# Patient Record
Sex: FEMALE | Race: WHITE | NOT HISPANIC OR LATINO | Employment: FULL TIME | URBAN - METROPOLITAN AREA
[De-identification: names, ages, dates, MRNs, and addresses within clinical notes are randomized per-mention and may not be internally consistent; named-entity substitution may affect disease eponyms.]

---

## 2023-02-14 PROBLEM — J30.2 SEASONAL ALLERGIES: Status: ACTIVE | Noted: 2023-02-14

## 2023-02-14 PROBLEM — G43.909 MIGRAINE HEADACHE: Status: ACTIVE | Noted: 2023-02-14

## 2023-02-14 PROBLEM — J06.9 URI (UPPER RESPIRATORY INFECTION): Status: ACTIVE | Noted: 2023-02-14

## 2023-02-14 PROBLEM — R82.89 ABNORMAL URINE CYTOLOGY: Status: ACTIVE | Noted: 2023-02-14

## 2023-02-14 PROBLEM — N20.0 KIDNEY STONES: Status: ACTIVE | Noted: 2023-02-14

## 2023-02-14 PROBLEM — R07.89 CHEST WALL PAIN: Status: ACTIVE | Noted: 2023-02-14

## 2023-02-14 PROBLEM — M25.561 CHRONIC PAIN OF RIGHT KNEE: Status: ACTIVE | Noted: 2023-02-14

## 2023-02-14 PROBLEM — I20.1 CORONARY ARTERY SPASM (CMS-HCC): Status: ACTIVE | Noted: 2023-02-14

## 2023-02-14 PROBLEM — R00.2 HEART PALPITATIONS: Status: ACTIVE | Noted: 2023-02-14

## 2023-02-14 PROBLEM — I34.0 MITRAL REGURGITATION: Status: ACTIVE | Noted: 2023-02-14

## 2023-02-14 PROBLEM — R01.1 HEART MURMUR, SYSTOLIC: Status: ACTIVE | Noted: 2023-02-14

## 2023-02-14 PROBLEM — G89.29 CHRONIC PAIN OF RIGHT KNEE: Status: ACTIVE | Noted: 2023-02-14

## 2023-02-14 PROBLEM — L50.9 URTICARIA: Status: ACTIVE | Noted: 2023-02-14

## 2023-02-14 PROBLEM — K57.30 DIVERTICULOSIS OF COLON: Status: ACTIVE | Noted: 2023-02-14

## 2023-02-14 PROBLEM — E66.09 CLASS 2 OBESITY DUE TO EXCESS CALORIES WITHOUT SERIOUS COMORBIDITY WITH BODY MASS INDEX (BMI) OF 36.0 TO 36.9 IN ADULT: Status: ACTIVE | Noted: 2023-02-14

## 2023-02-14 PROBLEM — E66.812 CLASS 2 OBESITY DUE TO EXCESS CALORIES WITHOUT SERIOUS COMORBIDITY WITH BODY MASS INDEX (BMI) OF 36.0 TO 36.9 IN ADULT: Status: ACTIVE | Noted: 2023-02-14

## 2023-02-14 PROBLEM — G43.109 CLASSIC MIGRAINE WITH AURA: Status: ACTIVE | Noted: 2023-02-14

## 2023-02-14 PROBLEM — G47.00 INSOMNIA: Status: ACTIVE | Noted: 2023-02-14

## 2023-02-14 PROBLEM — I49.3 SYMPTOMATIC PVCS: Status: ACTIVE | Noted: 2023-02-14

## 2023-02-14 PROBLEM — J06.9 ACUTE URI: Status: ACTIVE | Noted: 2023-02-14

## 2023-02-14 PROBLEM — J44.9 COPD, MODERATE (MULTI): Status: ACTIVE | Noted: 2023-02-14

## 2023-02-14 PROBLEM — R94.31 ABNORMAL EKG: Status: ACTIVE | Noted: 2023-02-14

## 2023-02-14 PROBLEM — Z86.69 HISTORY OF RECURRENT EAR INFECTION: Status: ACTIVE | Noted: 2023-02-14

## 2023-02-14 PROBLEM — Z72.821 INADEQUATE SLEEP HYGIENE: Status: ACTIVE | Noted: 2023-02-14

## 2023-02-14 RX ORDER — CETIRIZINE HYDROCHLORIDE 10 MG/1
1 TABLET ORAL DAILY
COMMUNITY
Start: 2022-04-14 | End: 2023-03-27 | Stop reason: ALTCHOICE

## 2023-02-14 RX ORDER — MULTIVITAMIN
1 TABLET ORAL DAILY
COMMUNITY
Start: 2022-03-25

## 2023-02-14 RX ORDER — ESTRADIOL 0.5 MG/1
1 TABLET ORAL DAILY
COMMUNITY
Start: 2022-03-25 | End: 2023-03-27 | Stop reason: ALTCHOICE

## 2023-02-14 RX ORDER — ALBUTEROL SULFATE 90 UG/1
2 AEROSOL, METERED RESPIRATORY (INHALATION) EVERY 4 HOURS PRN
COMMUNITY
End: 2023-03-27 | Stop reason: ALTCHOICE

## 2023-02-14 RX ORDER — FLUTICASONE PROPIONATE 50 MCG
SPRAY, SUSPENSION (ML) NASAL
COMMUNITY
End: 2023-09-08 | Stop reason: ALTCHOICE

## 2023-03-26 ASSESSMENT — PROMIS GLOBAL HEALTH SCALE
CARRYOUT_PHYSICAL_ACTIVITIES: COMPLETELY
EMOTIONAL_PROBLEMS: OFTEN
RATE_SOCIAL_SATISFACTION: VERY GOOD
RATE_QUALITY_OF_LIFE: VERY GOOD
RATE_PHYSICAL_HEALTH: FAIR
CARRYOUT_SOCIAL_ACTIVITIES: VERY GOOD
RATE_MENTAL_HEALTH: GOOD
RATE_AVERAGE_PAIN: 4
RATE_GENERAL_HEALTH: GOOD
RATE_AVERAGE_FATIGUE: SEVERE

## 2023-03-27 ENCOUNTER — OFFICE VISIT (OUTPATIENT)
Dept: PRIMARY CARE | Facility: CLINIC | Age: 53
End: 2023-03-27
Payer: COMMERCIAL

## 2023-03-27 VITALS
HEIGHT: 65 IN | DIASTOLIC BLOOD PRESSURE: 77 MMHG | WEIGHT: 221 LBS | RESPIRATION RATE: 14 BRPM | HEART RATE: 76 BPM | TEMPERATURE: 97.7 F | SYSTOLIC BLOOD PRESSURE: 117 MMHG | BODY MASS INDEX: 36.82 KG/M2

## 2023-03-27 DIAGNOSIS — N95.1 PERIMENOPAUSE: Primary | ICD-10-CM

## 2023-03-27 DIAGNOSIS — Z12.31 ENCOUNTER FOR SCREENING MAMMOGRAM FOR MALIGNANT NEOPLASM OF BREAST: ICD-10-CM

## 2023-03-27 PROBLEM — J44.9 COPD, MODERATE (MULTI): Status: RESOLVED | Noted: 2023-02-14 | Resolved: 2023-03-27

## 2023-03-27 PROBLEM — I49.3 SYMPTOMATIC PVCS: Status: RESOLVED | Noted: 2023-02-14 | Resolved: 2023-03-27

## 2023-03-27 PROBLEM — R94.31 ABNORMAL EKG: Status: RESOLVED | Noted: 2023-02-14 | Resolved: 2023-03-27

## 2023-03-27 PROBLEM — J06.9 ACUTE URI: Status: RESOLVED | Noted: 2023-02-14 | Resolved: 2023-03-27

## 2023-03-27 PROBLEM — L50.9 URTICARIA: Status: RESOLVED | Noted: 2023-02-14 | Resolved: 2023-03-27

## 2023-03-27 PROBLEM — Z86.69 HISTORY OF RECURRENT EAR INFECTION: Status: RESOLVED | Noted: 2023-02-14 | Resolved: 2023-03-27

## 2023-03-27 PROBLEM — Z72.821 INADEQUATE SLEEP HYGIENE: Status: RESOLVED | Noted: 2023-02-14 | Resolved: 2023-03-27

## 2023-03-27 PROBLEM — G43.909 MIGRAINE HEADACHE: Status: RESOLVED | Noted: 2023-02-14 | Resolved: 2023-03-27

## 2023-03-27 PROBLEM — R00.2 HEART PALPITATIONS: Status: RESOLVED | Noted: 2023-02-14 | Resolved: 2023-03-27

## 2023-03-27 PROBLEM — J06.9 URI (UPPER RESPIRATORY INFECTION): Status: RESOLVED | Noted: 2023-02-14 | Resolved: 2023-03-27

## 2023-03-27 PROBLEM — R82.89 ABNORMAL URINE CYTOLOGY: Status: RESOLVED | Noted: 2023-02-14 | Resolved: 2023-03-27

## 2023-03-27 PROBLEM — R07.89 CHEST WALL PAIN: Status: RESOLVED | Noted: 2023-02-14 | Resolved: 2023-03-27

## 2023-03-27 PROCEDURE — 1036F TOBACCO NON-USER: CPT | Performed by: FAMILY MEDICINE

## 2023-03-27 PROCEDURE — 99396 PREV VISIT EST AGE 40-64: CPT | Performed by: FAMILY MEDICINE

## 2023-03-27 RX ORDER — FLUOXETINE 10 MG/1
10 CAPSULE ORAL DAILY
Qty: 30 CAPSULE | Refills: 3 | Status: SHIPPED | OUTPATIENT
Start: 2023-03-27 | End: 2023-08-28 | Stop reason: WASHOUT

## 2023-03-27 NOTE — PROGRESS NOTES
Subjective   Nelda Hewitt is a 52 y.o. female who presents for a wellness visit.  HPI    Review of Systems  Visit Vitals  /77   Pulse 76   Temp 36.5 °C (97.7 °F)   Resp 14      Objective   Physical Exam  Constitutional:       Appearance: Normal appearance.   HENT:      Head: Normocephalic.   Eyes:      Conjunctiva/sclera: Conjunctivae normal.   Cardiovascular:      Rate and Rhythm: Normal rate and regular rhythm.   Pulmonary:      Effort: Pulmonary effort is normal.      Breath sounds: Normal breath sounds.   Musculoskeletal:      Cervical back: Neck supple.   Skin:     General: Skin is warm and dry.   Neurological:      Mental Status: She is alert.            Assessment/Plan    Nelda was seen today for annual exam.  Diagnoses and all orders for this visit:  Perimenopause  -     FLUoxetine (PROzac) 10 mg capsule; Take 1 capsule (10 mg) by mouth once daily.  Encounter for screening mammogram for malignant neoplasm of breast  -     CBC; Future  -     Comprehensive Metabolic Panel; Future  -     Lipid Panel; Future  -     Hepatitis C Antibody; Future  -     BI mammo bilateral screening tomosynthesis; Future  -     Follow Up In [facility] - PCP; Future       Perimenopause  Hot flashes and fatigue are the main problem.  Estrogen gave her headaches so we will try a low dose fluoxetine to mitigate symptoms

## 2023-04-01 ENCOUNTER — LAB (OUTPATIENT)
Dept: LAB | Facility: LAB | Age: 53
End: 2023-04-01
Payer: COMMERCIAL

## 2023-04-01 DIAGNOSIS — Z12.31 ENCOUNTER FOR SCREENING MAMMOGRAM FOR MALIGNANT NEOPLASM OF BREAST: ICD-10-CM

## 2023-04-01 LAB
ALANINE AMINOTRANSFERASE (SGPT) (U/L) IN SER/PLAS: 14 U/L (ref 7–45)
ALBUMIN (G/DL) IN SER/PLAS: 4 G/DL (ref 3.4–5)
ALKALINE PHOSPHATASE (U/L) IN SER/PLAS: 75 U/L (ref 33–110)
ANION GAP IN SER/PLAS: 14 MMOL/L (ref 10–20)
ASPARTATE AMINOTRANSFERASE (SGOT) (U/L) IN SER/PLAS: 15 U/L (ref 9–39)
BILIRUBIN TOTAL (MG/DL) IN SER/PLAS: 0.6 MG/DL (ref 0–1.2)
CALCIUM (MG/DL) IN SER/PLAS: 9.3 MG/DL (ref 8.6–10.3)
CARBON DIOXIDE, TOTAL (MMOL/L) IN SER/PLAS: 24 MMOL/L (ref 21–32)
CHLORIDE (MMOL/L) IN SER/PLAS: 108 MMOL/L (ref 98–107)
CHOLESTEROL (MG/DL) IN SER/PLAS: 187 MG/DL (ref 0–199)
CHOLESTEROL IN HDL (MG/DL) IN SER/PLAS: 46.6 MG/DL
CHOLESTEROL/HDL RATIO: 4
CREATININE (MG/DL) IN SER/PLAS: 0.67 MG/DL (ref 0.5–1.05)
ERYTHROCYTE DISTRIBUTION WIDTH (RATIO) BY AUTOMATED COUNT: 14.1 % (ref 11.5–14.5)
ERYTHROCYTE MEAN CORPUSCULAR HEMOGLOBIN CONCENTRATION (G/DL) BY AUTOMATED: 32.1 G/DL (ref 32–36)
ERYTHROCYTE MEAN CORPUSCULAR VOLUME (FL) BY AUTOMATED COUNT: 93 FL (ref 80–100)
ERYTHROCYTES (10*6/UL) IN BLOOD BY AUTOMATED COUNT: 4.44 X10E12/L (ref 4–5.2)
GFR FEMALE: >90 ML/MIN/1.73M2
GLUCOSE (MG/DL) IN SER/PLAS: 111 MG/DL (ref 74–99)
HEMATOCRIT (%) IN BLOOD BY AUTOMATED COUNT: 41.1 % (ref 36–46)
HEMOGLOBIN (G/DL) IN BLOOD: 13.2 G/DL (ref 12–16)
HEPATITIS C VIRUS AB PRESENCE IN SERUM: NONREACTIVE
LDL: 114 MG/DL (ref 0–99)
LEUKOCYTES (10*3/UL) IN BLOOD BY AUTOMATED COUNT: 7.5 X10E9/L (ref 4.4–11.3)
PLATELETS (10*3/UL) IN BLOOD AUTOMATED COUNT: 335 X10E9/L (ref 150–450)
POTASSIUM (MMOL/L) IN SER/PLAS: 4.2 MMOL/L (ref 3.5–5.3)
PROTEIN TOTAL: 6.6 G/DL (ref 6.4–8.2)
SODIUM (MMOL/L) IN SER/PLAS: 142 MMOL/L (ref 136–145)
TRIGLYCERIDE (MG/DL) IN SER/PLAS: 133 MG/DL (ref 0–149)
UREA NITROGEN (MG/DL) IN SER/PLAS: 12 MG/DL (ref 6–23)
VLDL: 27 MG/DL (ref 0–40)

## 2023-04-01 PROCEDURE — 80061 LIPID PANEL: CPT

## 2023-04-01 PROCEDURE — 85027 COMPLETE CBC AUTOMATED: CPT

## 2023-04-01 PROCEDURE — 36415 COLL VENOUS BLD VENIPUNCTURE: CPT

## 2023-04-01 PROCEDURE — 80053 COMPREHEN METABOLIC PANEL: CPT

## 2023-04-01 PROCEDURE — 86803 HEPATITIS C AB TEST: CPT

## 2023-05-03 ENCOUNTER — OFFICE VISIT (OUTPATIENT)
Dept: PRIMARY CARE | Facility: CLINIC | Age: 53
End: 2023-05-03
Payer: COMMERCIAL

## 2023-05-03 VITALS
RESPIRATION RATE: 16 BRPM | HEART RATE: 56 BPM | SYSTOLIC BLOOD PRESSURE: 108 MMHG | BODY MASS INDEX: 36.82 KG/M2 | DIASTOLIC BLOOD PRESSURE: 60 MMHG | TEMPERATURE: 97.7 F | HEIGHT: 65 IN | WEIGHT: 221 LBS

## 2023-05-03 DIAGNOSIS — R73.9 HYPERGLYCEMIA: ICD-10-CM

## 2023-05-03 LAB — POC HEMOGLOBIN A1C: 5.4 % (ref 4.2–6.5)

## 2023-05-03 PROCEDURE — 83036 HEMOGLOBIN GLYCOSYLATED A1C: CPT | Performed by: FAMILY MEDICINE

## 2023-05-03 PROCEDURE — 3008F BODY MASS INDEX DOCD: CPT | Performed by: FAMILY MEDICINE

## 2023-05-03 PROCEDURE — 1036F TOBACCO NON-USER: CPT | Performed by: FAMILY MEDICINE

## 2023-05-03 PROCEDURE — 99212 OFFICE O/P EST SF 10 MIN: CPT | Performed by: FAMILY MEDICINE

## 2023-05-03 NOTE — PROGRESS NOTES
Subjective   Nelda Hewitt is a 52 y.o. female who presents for Hyperglycemia.  Hyperglycemia  This is a new problem. She has tried nothing for the symptoms.     Visit Vitals  /60   Pulse 56   Temp 36.5 °C (97.7 °F)   Resp 16      Objective   Physical Exam  Constitutional:       Appearance: Normal appearance.   HENT:      Head: Normocephalic.   Pulmonary:      Effort: Pulmonary effort is normal.   Musculoskeletal:      Cervical back: Neck supple.      Right lower leg: No edema.      Left lower leg: No edema.   Skin:     General: Skin is warm and dry.   Psychiatric:         Mood and Affect: Mood normal.         Assessment/Plan    Problem List Items Addressed This Visit    None  Visit Diagnoses       Hyperglycemia        Relevant Orders    POCT glycosylated hemoglobin (Hb A1C) manually resulted (Completed)        This 52-year-old female had a mildly elevated glucose at 111 on a screening fasting metabolic panel.  She comes in today without any symptoms of hyper or hypoglycemia A1c that is within the normal range.  Thankfully this does not represent diabetes or any risk for diabetes.  As always I recommend healthy lifestyle and diet.  I recommend she follow-up in 1 year for her routine wellness physical.  We took time today to review all of the labs including a lipid panel, CBC, mammogram, and metabolic panel which are essentially all within normal limits.

## 2023-08-24 ENCOUNTER — OFFICE VISIT (OUTPATIENT)
Dept: PRIMARY CARE | Facility: CLINIC | Age: 53
End: 2023-08-24
Payer: COMMERCIAL

## 2023-08-24 VITALS
TEMPERATURE: 98.7 F | OXYGEN SATURATION: 97 % | SYSTOLIC BLOOD PRESSURE: 124 MMHG | DIASTOLIC BLOOD PRESSURE: 82 MMHG | HEART RATE: 82 BPM | BODY MASS INDEX: 37.99 KG/M2 | RESPIRATION RATE: 17 BRPM | HEIGHT: 65 IN | WEIGHT: 228 LBS

## 2023-08-24 DIAGNOSIS — J02.9 SORE THROAT: Primary | ICD-10-CM

## 2023-08-24 LAB — POC RAPID STREP: NEGATIVE

## 2023-08-24 PROCEDURE — 87880 STREP A ASSAY W/OPTIC: CPT | Performed by: FAMILY MEDICINE

## 2023-08-24 PROCEDURE — 3008F BODY MASS INDEX DOCD: CPT | Performed by: FAMILY MEDICINE

## 2023-08-24 PROCEDURE — 1036F TOBACCO NON-USER: CPT | Performed by: FAMILY MEDICINE

## 2023-08-24 PROCEDURE — 99213 OFFICE O/P EST LOW 20 MIN: CPT | Performed by: FAMILY MEDICINE

## 2023-08-24 PROCEDURE — 87651 STREP A DNA AMP PROBE: CPT

## 2023-08-24 ASSESSMENT — ENCOUNTER SYMPTOMS
DIFFICULTY URINATING: 0
VOMITING: 0
SWOLLEN GLANDS: 1
DIARRHEA: 0
SHORTNESS OF BREATH: 0
HEADACHES: 0
SINUS PAIN: 1
COUGH: 0
SORE THROAT: 1
WHEEZING: 0
FATIGUE: 0
FEVER: 0
MYALGIAS: 0
NAUSEA: 0

## 2023-08-24 NOTE — ASSESSMENT & PLAN NOTE
Advise pt rst negative, will send pcr and notify pt if positive and tx as needed  May use tylenol/ motrin,throat spray ,lozenges , saline gargles,  increase flds, rest  F/up if no improvement

## 2023-08-24 NOTE — PROGRESS NOTES
"TomSubjective   Patient ID: Nelda Hewitt is a 53 y.o. female who presents for URI.    URI   This is a new problem. The current episode started in the past 7 days. The problem has been gradually worsening. Associated symptoms include congestion, ear pain, a plugged ear sensation, sinus pain, a sore throat and swollen glands. Pertinent negatives include no coughing, diarrhea, headaches, nausea, vomiting or wheezing.        Review of Systems   Constitutional:  Negative for fatigue and fever.        3-4 d duration of symptoms. Grand kids have strep. No travel. No other contagious exposures.   HENT:  Positive for congestion, ear pain, sinus pain and sore throat. Negative for ear discharge.         Pt has taken mucinex   Respiratory:  Negative for cough, shortness of breath and wheezing.    Gastrointestinal:  Negative for diarrhea, nausea and vomiting.   Genitourinary:  Negative for difficulty urinating.   Musculoskeletal:  Negative for myalgias.   Neurological:  Negative for headaches.       Objective   /82 (BP Location: Right arm, Patient Position: Sitting, BP Cuff Size: Adult)   Pulse 82   Temp 37.1 °C (98.7 °F)   Resp 17   Ht 1.651 m (5' 5\")   Wt 103 kg (228 lb)   SpO2 97%   BMI 37.94 kg/m²     Physical Exam  Vitals and nursing note reviewed.   Constitutional:       General: She is not in acute distress.     Appearance: Normal appearance.   HENT:      Head: Normocephalic and atraumatic.      Right Ear: Tympanic membrane, ear canal and external ear normal.      Left Ear: Tympanic membrane, ear canal and external ear normal.      Nose: Congestion present.      Mouth/Throat:      Mouth: Mucous membranes are moist.      Pharynx: Posterior oropharyngeal erythema present.   Cardiovascular:      Rate and Rhythm: Normal rate and regular rhythm.      Heart sounds: Normal heart sounds.   Pulmonary:      Effort: Pulmonary effort is normal.      Breath sounds: Normal breath sounds.   Musculoskeletal:      " Cervical back: Neck supple.   Lymphadenopathy:      Cervical: No cervical adenopathy.   Skin:     General: Skin is warm and dry.   Neurological:      Mental Status: She is alert.         Assessment/Plan   Problem List Items Addressed This Visit       Sore throat - Primary     Advise pt rst negative, will send pcr and notify pt if positive and tx as needed  May use tylenol/ motrin,throat spray ,lozenges , saline gargles,  increase flds, rest  F/up if no improvement         Relevant Orders    POCT Rapid Strep A manually resulted (Completed)    Group A Streptococcus, PCR

## 2023-08-25 LAB — GROUP A STREP, PCR: NOT DETECTED

## 2023-08-28 ENCOUNTER — OFFICE VISIT (OUTPATIENT)
Dept: PRIMARY CARE | Facility: CLINIC | Age: 53
End: 2023-08-28
Payer: COMMERCIAL

## 2023-08-28 VITALS
TEMPERATURE: 97.7 F | WEIGHT: 228 LBS | BODY MASS INDEX: 37.94 KG/M2 | OXYGEN SATURATION: 97 % | HEART RATE: 86 BPM | SYSTOLIC BLOOD PRESSURE: 118 MMHG | DIASTOLIC BLOOD PRESSURE: 72 MMHG | RESPIRATION RATE: 18 BRPM

## 2023-08-28 DIAGNOSIS — H66.93 ACUTE BILATERAL OTITIS MEDIA: Primary | ICD-10-CM

## 2023-08-28 PROCEDURE — 1036F TOBACCO NON-USER: CPT | Performed by: FAMILY MEDICINE

## 2023-08-28 PROCEDURE — 99214 OFFICE O/P EST MOD 30 MIN: CPT | Performed by: FAMILY MEDICINE

## 2023-08-28 PROCEDURE — 3008F BODY MASS INDEX DOCD: CPT | Performed by: FAMILY MEDICINE

## 2023-08-28 RX ORDER — AZITHROMYCIN 250 MG/1
TABLET, FILM COATED ORAL
Qty: 6 TABLET | Refills: 0 | Status: SHIPPED | OUTPATIENT
Start: 2023-08-28 | End: 2023-09-02

## 2023-08-28 ASSESSMENT — ENCOUNTER SYMPTOMS
SHORTNESS OF BREATH: 0
MYALGIAS: 1
COUGH: 0
SORE THROAT: 0
DIARRHEA: 0
FATIGUE: 0
NAUSEA: 0
DIFFICULTY URINATING: 0
WHEEZING: 0
FEVER: 0
RHINORRHEA: 0
VOMITING: 0

## 2023-08-28 NOTE — ASSESSMENT & PLAN NOTE
Advise pt to take med as directed  Cont flonase  May take tylenol or motrin as needed  F/up if no improvement

## 2023-08-28 NOTE — PROGRESS NOTES
Subjective   Patient ID: Nelda Hewitt is a 53 y.o. female who presents for Earache.    Earache   There is pain in both ears. This is a new problem. The current episode started in the past 7 days (4 days). The problem occurs constantly. The problem has been unchanged. There has been no fever. Pertinent negatives include no coughing, diarrhea, ear discharge, rhinorrhea, sore throat or vomiting. Treatments tried: Flonase. The treatment provided no relief.        Review of Systems   Constitutional:  Negative for fatigue and fever.   HENT:  Positive for ear pain. Negative for congestion, ear discharge, rhinorrhea and sore throat.         Bilateral ear pain. Pt did home covid test and was neg. Last visit 8/24/23 rst and pcr both negative.   Respiratory:  Negative for cough, shortness of breath and wheezing.    Gastrointestinal:  Negative for diarrhea, nausea and vomiting.   Genitourinary:  Negative for difficulty urinating.   Musculoskeletal:  Positive for myalgias.       Objective   /72   Pulse 86   Temp 36.5 °C (97.7 °F) (Temporal)   Resp 18   Wt 103 kg (228 lb)   SpO2 97%   BMI 37.94 kg/m²     Physical Exam  Vitals and nursing note reviewed.   Constitutional:       General: She is not in acute distress.     Appearance: Normal appearance.   HENT:      Head: Normocephalic and atraumatic.      Right Ear: Ear canal and external ear normal.      Left Ear: Ear canal and external ear normal.      Ears:      Comments: Bilateral TMs are pink red, no effusions,      Nose: Nose normal.      Mouth/Throat:      Mouth: Mucous membranes are moist.      Pharynx: Oropharynx is clear.   Cardiovascular:      Rate and Rhythm: Normal rate and regular rhythm.      Heart sounds: Normal heart sounds.   Pulmonary:      Effort: Pulmonary effort is normal.      Breath sounds: Normal breath sounds.   Musculoskeletal:      Cervical back: Neck supple.   Lymphadenopathy:      Cervical: No cervical adenopathy.   Neurological:       Mental Status: She is alert.         Assessment/Plan   Problem List Items Addressed This Visit       Acute bilateral otitis media - Primary     Advise pt to take med as directed  Cont flonase  May take tylenol or motrin as needed  F/up if no improvement         Relevant Medications    azithromycin (Zithromax) 250 mg tablet

## 2023-09-08 ENCOUNTER — OFFICE VISIT (OUTPATIENT)
Dept: PRIMARY CARE | Facility: CLINIC | Age: 53
End: 2023-09-08
Payer: COMMERCIAL

## 2023-09-08 VITALS
HEART RATE: 96 BPM | TEMPERATURE: 98.3 F | RESPIRATION RATE: 14 BRPM | HEIGHT: 65 IN | BODY MASS INDEX: 34.66 KG/M2 | DIASTOLIC BLOOD PRESSURE: 78 MMHG | SYSTOLIC BLOOD PRESSURE: 113 MMHG | WEIGHT: 208 LBS

## 2023-09-08 DIAGNOSIS — J34.3 HYPERTROPHY OF BOTH INFERIOR NASAL TURBINATES: ICD-10-CM

## 2023-09-08 DIAGNOSIS — H69.93 DYSFUNCTION OF BOTH EUSTACHIAN TUBES: Primary | ICD-10-CM

## 2023-09-08 PROBLEM — G43.909 MIGRAINE HEADACHE: Status: ACTIVE | Noted: 2023-02-14

## 2023-09-08 PROBLEM — J02.9 SORE THROAT: Status: RESOLVED | Noted: 2023-08-24 | Resolved: 2023-09-08

## 2023-09-08 PROBLEM — H66.93 ACUTE BILATERAL OTITIS MEDIA: Status: RESOLVED | Noted: 2023-08-28 | Resolved: 2023-09-08

## 2023-09-08 PROCEDURE — 99213 OFFICE O/P EST LOW 20 MIN: CPT | Performed by: FAMILY MEDICINE

## 2023-09-08 PROCEDURE — 3008F BODY MASS INDEX DOCD: CPT | Performed by: FAMILY MEDICINE

## 2023-09-08 PROCEDURE — 1036F TOBACCO NON-USER: CPT | Performed by: FAMILY MEDICINE

## 2023-09-08 RX ORDER — CETIRIZINE HYDROCHLORIDE 10 MG/1
1 TABLET ORAL DAILY
COMMUNITY
Start: 2022-04-14 | End: 2023-09-08 | Stop reason: ALTCHOICE

## 2023-09-08 RX ORDER — ALBUTEROL SULFATE 90 UG/1
2 AEROSOL, METERED RESPIRATORY (INHALATION) EVERY 4 HOURS PRN
COMMUNITY
Start: 2022-12-06 | End: 2024-01-26 | Stop reason: ALTCHOICE

## 2023-09-08 RX ORDER — LORATADINE AND PSEUDOEPHEDRINE SULFATE 5; 120 MG/1; MG/1
1 TABLET, EXTENDED RELEASE ORAL 2 TIMES DAILY
Qty: 14 TABLET | Refills: 0 | Status: SHIPPED | OUTPATIENT
Start: 2023-09-08 | End: 2024-01-26 | Stop reason: ALTCHOICE

## 2023-09-08 ASSESSMENT — ENCOUNTER SYMPTOMS
HEADACHES: 1
SORE THROAT: 1
VOMITING: 0
DIARRHEA: 1
RHINORRHEA: 0
NECK PAIN: 1
COUGH: 0
ABDOMINAL PAIN: 0

## 2023-09-08 NOTE — PROGRESS NOTES
Subjective   Nelda Hewitt is a 53 y.o. female who presents for Earache.  Earache   There is pain in both ears. This is a recurrent problem. The current episode started 1 to 4 weeks ago. The problem occurs constantly. The problem has been gradually worsening. There has been no fever. The pain is at a severity of 4/10. Associated symptoms include diarrhea, headaches, neck pain and a sore throat. Pertinent negatives include no abdominal pain, coughing, ear discharge, hearing loss, rash, rhinorrhea or vomiting.     Visit Vitals  /78   Pulse 96   Temp 36.8 °C (98.3 °F)   Resp 14      Objective   Physical Exam  Constitutional:       Appearance: Normal appearance.   HENT:      Head: Normocephalic.      Right Ear: Ear canal and external ear normal. A middle ear effusion is present. No hemotympanum. Tympanic membrane is retracted. Tympanic membrane is not injected, perforated or erythematous.      Left Ear: Ear canal and external ear normal. A middle ear effusion is present. No hemotympanum. Tympanic membrane is retracted. Tympanic membrane is not injected, perforated or erythematous.      Nose: Nose normal.      Mouth/Throat:      Mouth: Mucous membranes are moist.   Eyes:      Conjunctiva/sclera: Conjunctivae normal.   Cardiovascular:      Rate and Rhythm: Normal rate and regular rhythm.   Pulmonary:      Effort: Pulmonary effort is normal.      Breath sounds: Normal breath sounds.   Skin:     General: Skin is warm.      Findings: No rash.   Neurological:      Mental Status: She is alert.   Psychiatric:         Mood and Affect: Mood normal.         Assessment/Plan    Problem List Items Addressed This Visit       Hypertrophy of both inferior nasal turbinates     She is scheduled for nasal surgery later this week.          Other Visit Diagnoses       Dysfunction of both eustachian tubes    -  Primary    Relevant Medications    loratadine-pseudoephedrine (Claritin-D 12 Hour) 5-120 mg 12 hr tablet        This  53-year-old female was treated for a suspected otitis media last week and did not get much relief.  Examination today reveals retracted tympanic membranes with clear effusions bilaterally.  I suspect this is just eustachian tube dysfunction left over from the previous infection.  We will treat this with pseudoephedrine and antihistamine.  She has no issue with hypertension.  She will use this medication until symptoms clear and then stop

## 2023-09-14 ENCOUNTER — HOSPITAL ENCOUNTER (OUTPATIENT)
Dept: DATA CONVERSION | Facility: HOSPITAL | Age: 53
End: 2023-09-14
Attending: OTOLARYNGOLOGY | Admitting: OTOLARYNGOLOGY
Payer: COMMERCIAL

## 2023-09-14 DIAGNOSIS — Z88.0 ALLERGY STATUS TO PENICILLIN: ICD-10-CM

## 2023-09-14 DIAGNOSIS — J34.3 HYPERTROPHY OF NASAL TURBINATES: ICD-10-CM

## 2023-09-25 ENCOUNTER — OFFICE VISIT (OUTPATIENT)
Dept: PRIMARY CARE | Facility: CLINIC | Age: 53
End: 2023-09-25
Payer: COMMERCIAL

## 2023-09-25 VITALS
SYSTOLIC BLOOD PRESSURE: 112 MMHG | RESPIRATION RATE: 16 BRPM | OXYGEN SATURATION: 97 % | DIASTOLIC BLOOD PRESSURE: 78 MMHG | TEMPERATURE: 95.2 F | BODY MASS INDEX: 38.32 KG/M2 | HEIGHT: 65 IN | HEART RATE: 60 BPM | WEIGHT: 230 LBS

## 2023-09-25 DIAGNOSIS — R30.0 DYSURIA: Primary | ICD-10-CM

## 2023-09-25 LAB
POC APPEARANCE, URINE: ABNORMAL
POC BILIRUBIN, URINE: NEGATIVE
POC BLOOD, URINE: NEGATIVE
POC COLOR, URINE: YELLOW
POC GLUCOSE, URINE: NEGATIVE MG/DL
POC KETONES, URINE: NEGATIVE MG/DL
POC LEUKOCYTES, URINE: ABNORMAL
POC NITRITE,URINE: NEGATIVE
POC PH, URINE: 5.5 PH
POC PROTEIN, URINE: NEGATIVE MG/DL
POC SPECIFIC GRAVITY, URINE: <=1.005
POC UROBILINOGEN, URINE: 0.2 EU/DL

## 2023-09-25 PROCEDURE — 81003 URINALYSIS AUTO W/O SCOPE: CPT | Performed by: FAMILY MEDICINE

## 2023-09-25 PROCEDURE — 99213 OFFICE O/P EST LOW 20 MIN: CPT | Performed by: FAMILY MEDICINE

## 2023-09-25 PROCEDURE — 87086 URINE CULTURE/COLONY COUNT: CPT

## 2023-09-25 PROCEDURE — 1036F TOBACCO NON-USER: CPT | Performed by: FAMILY MEDICINE

## 2023-09-25 PROCEDURE — 3008F BODY MASS INDEX DOCD: CPT | Performed by: FAMILY MEDICINE

## 2023-09-25 RX ORDER — CEFDINIR 300 MG/1
600 CAPSULE ORAL DAILY
COMMUNITY
Start: 2023-09-14 | End: 2024-01-26 | Stop reason: ALTCHOICE

## 2023-09-25 ASSESSMENT — ENCOUNTER SYMPTOMS: FREQUENCY: 1

## 2023-09-25 NOTE — PROGRESS NOTES
Subjective   Nelda Hewitt is a 53 y.o. female who presents for UTI (Possible).    Pt. Stated started taking cranberry pills an a lot of water over the weekend.    UTI   This is a new problem. Episode onset: started this past Friday. The problem has been unchanged. Quality: Pt feels pressure, sore to touch in abdomen region, urine is cloudy. The pain is at a severity of 5/10. The pain is mild. Associated symptoms include frequency. Pertinent negatives include no discharge.     Visit Vitals  /78 (BP Location: Left arm, Patient Position: Sitting)   Pulse 60   Temp 35.1 °C (95.2 °F)   Resp 16      Objective   Physical Exam  Constitutional:       Appearance: Normal appearance.   HENT:      Head: Normocephalic.   Pulmonary:      Effort: Pulmonary effort is normal.   Abdominal:      Tenderness: There is no right CVA tenderness or left CVA tenderness.   Musculoskeletal:      Cervical back: Neck supple.   Skin:     General: Skin is warm and dry.   Psychiatric:         Mood and Affect: Mood normal.       Assessment/Plan    This 53-year-old female recently underwent sinus surgery and is on prophylactic cefdinir per ENT.  She developed a few days history of dysuria and flank pain and urinary urgency although there is no fever or nausea or hematuria.  Urine dipstick today is none diagnostic so we will send a urine culture and treat accordingly.  The results may be questionable due to the cefdinir in her system, so if symptoms persist we will need to repeat the urine culture  Problem List Items Addressed This Visit    None  Visit Diagnoses       Dysuria    -  Primary    Relevant Orders    POCT UA Automated manually resulted

## 2023-09-26 LAB — URINE CULTURE: NORMAL

## 2023-09-29 VITALS
HEIGHT: 65 IN | RESPIRATION RATE: 18 BRPM | DIASTOLIC BLOOD PRESSURE: 76 MMHG | BODY MASS INDEX: 36.73 KG/M2 | HEART RATE: 74 BPM | TEMPERATURE: 98.2 F | WEIGHT: 220.46 LBS | SYSTOLIC BLOOD PRESSURE: 131 MMHG

## 2023-09-30 NOTE — H&P
History of Present Illness:   Pregnant/Lactating:  ·  Are You Pregnant no   ·  Are You Currently Breastfeeding no     History Present Illness:  Reason for surgery: Inferior turbinate hypertrophy  inferior turbinate hypertrophy for Coblation radiofrequency reduction and outfracture   HPI:    53-year-old female with turbinate hypertrophy for Coblation radiofrequency reduction and outfracture    Allergies:        Allergies:  ·  penicillin : Hives/Urticaria  ·  Levaquin : Hives/Urticaria  ·  vancomycin : Hives/Urticaria       Intolerances:  ·  doxycycline : GI Upset, Nausea/Vomiting    Home Medication Review:   Home Medications Reviewed: yes     Impression/Procedure:   ·  Impression and Planned Procedure: 53-year-old female with turbinate hypertrophy for Coblation radiofrequency reduction and outfracture       Vital Signs:  Temperature C: 36.8 degrees C   Temperature F: 98.2 degrees F   Heart Rate: 74 beats per minute   Respiratory Rate: 18 breath per minute   Blood Pressure Systolic: 131 mm/Hg   Blood Pressure Diastolic: 76 mm/Hg     Physical Exam by System:    Constitutional: Well developed, awake/alert/oriented  x3, no distress, alert and cooperative   ENMT: mucous membranes moist, no apparent injury,  no lesions seen  Turbinate hypertrophy noted   Head/Neck: Neck supple, no apparent injury, thyroid  without mass or tenderness, No JVD, trachea midline, no bruits   Respiratory/Thorax: Patent airways, CTAB, normal  breath sounds with good chest expansion, thorax symmetric   Cardiovascular: Regular, rate and rhythm, no murmurs,  2+ equal pulses of the extremities, normal S 1and S 2   Gastrointestinal: Nondistended, soft, non-tender,  no rebound tenderness or guarding, no masses palpable, no organomegaly, +BS, no bruits     Consent:   COVID-19 Consent:  ·  COVID-19 Risk Consent Surgeon has reviewed key risks related to the risk of asia COVID-19 and if they contract COVID-19 what the risks are.       Electronic  Signatures:  Ahmet Morrissey)  (Signed 14-Sep-2023 12:02)   Authored: History of Present Illness, Allergies, Home  Medication Review, Impression/Procedure, Physical Exam, Consent, Note Completion      Last Updated: 14-Sep-2023 12:02 by Ahmet Morrissey)

## 2023-10-01 NOTE — OP NOTE
PROCEDURE DETAILS    Preoperative Diagnosis:  Inferior turbinate hypertrophy  Postoperative Diagnosis:  Inferior turbinate hypertrophy  Surgeon: Ahmet Morrissey  Resident/Fellow/Other Assistant: None of these were associated with this case    Procedure:  1. OUTFRACTURE INFERIOR TURBINATES, COBLATION TURBINATES    Anesthesia: Janes Jackson  Estimated Blood Loss: Minimal  Findings: No unusual findings  Specimens(s) Collected: no,     Complications: None        Operative Report:   The patient was taken to the operating room and administered general anesthesia.  Appropriate prep and drape and timeout were performed in usual manner.  The patient had each of the  inferior turbinates identified.  Each was infiltrated with lidocaine 1%-epinephrine 1/100,000.  Each inferior turbinate was outfractured.  Each inferior turbinate was subjected to Coblation radiofrequency reduction utilizing the Coblation wand at a setting  of 6 for roughly 8 to 10 seconds per past.  Upon completion of same the patient had nasal pledgets positioned and will be removed in recovery.  Patient allowed to be released from anesthesia and taken to recovery in a stable condition.  Estimated blood  loss minimal.  No complication.                        Attestation:   Note Completion:  Attending Attestation I performed the procedure without a resident         Electronic Signatures:  Ahmet Morrissey)  (Signed 14-Sep-2023 16:04)   Authored: Post-Operative Note, Chart Review, Note Completion      Last Updated: 14-Sep-2023 16:04 by Ahmet Morrissey)

## 2024-01-26 ENCOUNTER — TELEMEDICINE (OUTPATIENT)
Dept: PRIMARY CARE | Facility: CLINIC | Age: 54
End: 2024-01-26
Payer: COMMERCIAL

## 2024-01-26 DIAGNOSIS — J32.9 SINUSITIS, BACTERIAL: Primary | ICD-10-CM

## 2024-01-26 DIAGNOSIS — B96.89 SINUSITIS, BACTERIAL: Primary | ICD-10-CM

## 2024-01-26 PROCEDURE — 99213 OFFICE O/P EST LOW 20 MIN: CPT | Performed by: FAMILY MEDICINE

## 2024-01-26 RX ORDER — IPRATROPIUM BROMIDE 21 UG/1
2 SPRAY, METERED NASAL EVERY 12 HOURS
Qty: 30 ML | Refills: 0 | Status: SHIPPED | OUTPATIENT
Start: 2024-01-26 | End: 2024-02-25

## 2024-01-26 ASSESSMENT — ENCOUNTER SYMPTOMS: SINUS PAIN: 1

## 2024-01-26 NOTE — PROGRESS NOTES
Virtual or Telephone Consent    An interactive audio and video telecommunication system which permits real time communications between the patient (at the originating site) and provider (at the distant site) was utilized to provide this telehealth service.   Verbal consent was requested and obtained from Nelda Hewitt on this date, 01/26/24 for a telehealth visit.  Subjective   Nelda Hewitt is a 53 y.o. female who presents for URI.  URI   The current episode started 1 to 4 weeks ago. The problem has been unchanged. There has been no fever. Associated symptoms include ear pain and sinus pain. Treatments tried: oral antibiotics. The treatment provided no relief.   She describes about a week of fairly significant facial pressure and pain radiating into her teeth feeling like her teeth are going to pop out.  She is also having aching in her left ear and some low-grade fevers and 1 episode of bloody nasal discharge.  She was treated at the [facility] urgent care with cefdinir 3 days ago for a suspected sinusitis and otitis media.  She is worried because she is not getting better  There were no vitals taken for this visit.   Objective   Physical Exam  Constitutional:       Appearance: Normal appearance.   HENT:      Head: Normocephalic.   Pulmonary:      Effort: Pulmonary effort is normal.   Musculoskeletal:      Cervical back: Neck supple.   Skin:     General: Skin is warm and dry.   Psychiatric:         Mood and Affect: Mood normal.         Assessment/Plan    Problem List Items Addressed This Visit    None  Visit Diagnoses       Sinusitis, bacterial    -  Primary    Relevant Medications    ipratropium (Atrovent) 21 mcg (0.03 %) nasal spray        This 53-year-old female has classic presentation of an acute bacterial sinusitis.  She is already been treated for the last 3 days with cefdinir which is an appropriate antibiotic for this infection.  I explained that the symptoms of sinusitis sometimes  take longer to resolve as they are more related to pressure than actual infection.  We will address this symptom with the addition of Atrovent nasal spray.  She is also going to continue using Sudafed and nasal saline rinse.  If her symptoms have not improved by Monday or if she develops fevers and chills then I would recommend that this is an antibiotic failure and switch her to a different antibiotic

## 2024-01-30 ENCOUNTER — HOSPITAL ENCOUNTER (OUTPATIENT)
Dept: RADIOLOGY | Facility: HOSPITAL | Age: 54
Discharge: HOME | End: 2024-01-30
Payer: COMMERCIAL

## 2024-01-30 ENCOUNTER — TELEPHONE (OUTPATIENT)
Dept: PRIMARY CARE | Facility: CLINIC | Age: 54
End: 2024-01-30
Payer: COMMERCIAL

## 2024-01-30 DIAGNOSIS — B96.89 SINUSITIS, BACTERIAL: ICD-10-CM

## 2024-01-30 DIAGNOSIS — J32.9 SINUSITIS, BACTERIAL: ICD-10-CM

## 2024-01-30 PROCEDURE — 70486 CT MAXILLOFACIAL W/O DYE: CPT

## 2024-01-31 DIAGNOSIS — J32.9 SINUSITIS, BACTERIAL: Primary | ICD-10-CM

## 2024-01-31 DIAGNOSIS — B96.89 SINUSITIS, BACTERIAL: Primary | ICD-10-CM

## 2024-01-31 RX ORDER — CEFDINIR 300 MG/1
300 CAPSULE ORAL 2 TIMES DAILY
Qty: 28 CAPSULE | Refills: 0 | Status: SHIPPED | OUTPATIENT
Start: 2024-01-31 | End: 2024-02-14

## 2024-01-31 NOTE — RESULT ENCOUNTER NOTE
Nelda Hewitt was called and informed The CT of the sinus does confirm a persistent frontal sinusitis with a complete opacification of the frontal sinus.  She is still very symptomatic.  She completed a 7-day course of cefdinir but is unfortunately allergic to all of the other alternatives including penicillin, doxycycline, Levaquin.  We discussed the pros and cons of the neck step in treatment and since she did have some clinical improvement on the 7 days of cefdinir I think a 14-day course of this may allow her immune system to fight this off.  She will continue decongestions and nasal sprays and staying well-hydrated during this time.  If this does not resolve the sinusitis and a referral to ENT would be appropriate

## 2024-03-25 ENCOUNTER — OFFICE VISIT (OUTPATIENT)
Dept: PRIMARY CARE | Facility: CLINIC | Age: 54
End: 2024-03-25
Payer: COMMERCIAL

## 2024-03-25 VITALS
HEART RATE: 68 BPM | BODY MASS INDEX: 38.65 KG/M2 | SYSTOLIC BLOOD PRESSURE: 115 MMHG | TEMPERATURE: 97.5 F | RESPIRATION RATE: 16 BRPM | HEIGHT: 65 IN | WEIGHT: 232 LBS | DIASTOLIC BLOOD PRESSURE: 80 MMHG

## 2024-03-25 DIAGNOSIS — Z12.31 ENCOUNTER FOR SCREENING MAMMOGRAM FOR MALIGNANT NEOPLASM OF BREAST: ICD-10-CM

## 2024-03-25 DIAGNOSIS — Z00.00 ROUTINE GENERAL MEDICAL EXAMINATION AT A HEALTH CARE FACILITY: ICD-10-CM

## 2024-03-25 DIAGNOSIS — M25.562 CHRONIC PAIN OF BOTH KNEES: ICD-10-CM

## 2024-03-25 DIAGNOSIS — R91.8 MULTIPLE PULMONARY NODULES: ICD-10-CM

## 2024-03-25 DIAGNOSIS — R01.1 HEART MURMUR, SYSTOLIC: ICD-10-CM

## 2024-03-25 DIAGNOSIS — G89.29 CHRONIC PAIN OF BOTH KNEES: ICD-10-CM

## 2024-03-25 DIAGNOSIS — Z88.9 ALLERGY TO MULTIPLE DRUGS: Primary | ICD-10-CM

## 2024-03-25 DIAGNOSIS — N95.1 PERIMENOPAUSE: ICD-10-CM

## 2024-03-25 DIAGNOSIS — M25.561 CHRONIC PAIN OF BOTH KNEES: ICD-10-CM

## 2024-03-25 DIAGNOSIS — Z23 IMMUNIZATION DUE: ICD-10-CM

## 2024-03-25 PROBLEM — M25.569 KNEE PAIN: Status: ACTIVE | Noted: 2023-02-14

## 2024-03-25 PROBLEM — M54.16 LUMBAR RADICULOPATHY: Status: ACTIVE | Noted: 2023-05-08

## 2024-03-25 PROCEDURE — 99396 PREV VISIT EST AGE 40-64: CPT | Performed by: FAMILY MEDICINE

## 2024-03-25 PROCEDURE — 90471 IMMUNIZATION ADMIN: CPT | Performed by: FAMILY MEDICINE

## 2024-03-25 PROCEDURE — 90715 TDAP VACCINE 7 YRS/> IM: CPT | Performed by: FAMILY MEDICINE

## 2024-03-25 PROCEDURE — 1036F TOBACCO NON-USER: CPT | Performed by: FAMILY MEDICINE

## 2024-03-25 NOTE — ASSESSMENT & PLAN NOTE
She continues to have regular hot flashes but they are diminishing in severity and frequency.  She did try low-dose estrogen as well as fluoxetine and did not do well with these treatments.

## 2024-03-25 NOTE — PROGRESS NOTES
Subjective   Nelda Hewitt is a 53 y.o. female who presents for a wellness visit.  HPI   Review of Systems  Visit Vitals  /80   Pulse 68   Temp 36.4 °C (97.5 °F)   Resp 16      Objective   Physical Exam  Constitutional:       Appearance: Normal appearance.   HENT:      Head: Normocephalic.   Eyes:      Conjunctiva/sclera: Conjunctivae normal.   Cardiovascular:      Rate and Rhythm: Normal rate and regular rhythm.      Heart sounds: Normal heart sounds.   Pulmonary:      Effort: Pulmonary effort is normal.      Breath sounds: Normal breath sounds.   Musculoskeletal:      Cervical back: Neck supple.   Skin:     General: Skin is warm and dry.   Neurological:      Mental Status: She is alert.       Assessment/Plan    Problem List Items Addressed This Visit       Chronic pain of both knees     This is likely mild osteoarthritis and she is managing with turmeric which is effective         Heart murmur, systolic    Perimenopause     She continues to have regular hot flashes but they are diminishing in severity and frequency.  She did try low-dose estrogen as well as fluoxetine and did not do well with these treatments.         Multiple pulmonary nodules     She does have a 30-year history of smoking although she is currently a non-smoker.  She has previously been evaluated by pulmonology and was following some benign-appearing nodules and a cyst in the lung.  It is been 2 years since her last CT scans we will go ahead and get her started back on the once annual CT screening         Relevant Orders    CT lung screening follow up CT chest wo IV contrast     Other Visit Diagnoses       Allergy to multiple drugs    -  Primary    Relevant Orders    Referral to Allergy    Encounter for screening mammogram for malignant neoplasm of breast        Relevant Orders    BI mammo bilateral screening tomosynthesis    Immunization due        Relevant Orders    Tdap vaccine, age 7 years and older (Completed)    Routine general  medical examination at a health care facility        Relevant Orders    Follow Up In [facility] - PCP - Health Maintenance        Today we reviewed his CBC, CMP from a few months ago at her ENT doctor which was within normal limits.  Her lipid panel was normal last year so there is no reason to repeat these labs today.    We will get her a tetanus booster which she is due for but she does no longer need a Pap test due to the fact that she is status post hysterectomy for benign reasons.    She has multiple antibiotic allergies listed on her chart so we will refer her to allergy immunology for testing to determine what antibiotics are actually safe for her going forward in the future.

## 2024-03-25 NOTE — ASSESSMENT & PLAN NOTE
She does have a 30-year history of smoking although she is currently a non-smoker.  She has previously been evaluated by pulmonology and was following some benign-appearing nodules and a cyst in the lung.  It is been 2 years since her last CT scans we will go ahead and get her started back on the once annual CT screening

## 2024-03-30 ENCOUNTER — APPOINTMENT (OUTPATIENT)
Dept: RADIOLOGY | Facility: HOSPITAL | Age: 54
End: 2024-03-30
Payer: COMMERCIAL

## 2024-04-06 ENCOUNTER — APPOINTMENT (OUTPATIENT)
Dept: RADIOLOGY | Facility: HOSPITAL | Age: 54
End: 2024-04-06
Payer: COMMERCIAL

## 2024-04-17 ENCOUNTER — APPOINTMENT (OUTPATIENT)
Dept: RADIOLOGY | Facility: HOSPITAL | Age: 54
End: 2024-04-17
Payer: COMMERCIAL

## 2024-04-18 ENCOUNTER — APPOINTMENT (OUTPATIENT)
Dept: PODIATRY | Facility: CLINIC | Age: 54
End: 2024-04-18
Payer: COMMERCIAL

## 2024-04-20 ENCOUNTER — APPOINTMENT (OUTPATIENT)
Dept: RADIOLOGY | Facility: HOSPITAL | Age: 54
End: 2024-04-20
Payer: COMMERCIAL

## 2024-04-22 ENCOUNTER — HOSPITAL ENCOUNTER (OUTPATIENT)
Dept: RADIOLOGY | Facility: HOSPITAL | Age: 54
Discharge: HOME | End: 2024-04-22
Payer: COMMERCIAL

## 2024-04-22 VITALS — HEIGHT: 65 IN | BODY MASS INDEX: 37.49 KG/M2 | WEIGHT: 225 LBS

## 2024-04-22 DIAGNOSIS — Z12.31 ENCOUNTER FOR SCREENING MAMMOGRAM FOR MALIGNANT NEOPLASM OF BREAST: ICD-10-CM

## 2024-04-22 PROCEDURE — 77067 SCR MAMMO BI INCL CAD: CPT | Performed by: RADIOLOGY

## 2024-04-22 PROCEDURE — 77067 SCR MAMMO BI INCL CAD: CPT

## 2024-04-22 PROCEDURE — 77063 BREAST TOMOSYNTHESIS BI: CPT | Performed by: RADIOLOGY

## 2024-04-25 ENCOUNTER — APPOINTMENT (OUTPATIENT)
Dept: ALLERGY | Facility: CLINIC | Age: 54
End: 2024-04-25
Payer: COMMERCIAL

## 2024-05-17 ENCOUNTER — HOSPITAL ENCOUNTER (OUTPATIENT)
Dept: RADIOLOGY | Facility: CLINIC | Age: 54
End: 2024-05-17
Payer: COMMERCIAL

## 2024-06-04 ENCOUNTER — TELEPHONE (OUTPATIENT)
Dept: PRIMARY CARE | Facility: CLINIC | Age: 54
End: 2024-06-04
Payer: COMMERCIAL

## 2024-06-04 DIAGNOSIS — E66.09 CLASS 2 OBESITY DUE TO EXCESS CALORIES WITHOUT SERIOUS COMORBIDITY WITH BODY MASS INDEX (BMI) OF 36.0 TO 36.9 IN ADULT: Primary | ICD-10-CM

## 2024-06-18 ENCOUNTER — APPOINTMENT (OUTPATIENT)
Dept: OTOLARYNGOLOGY | Facility: CLINIC | Age: 54
End: 2024-06-18
Payer: COMMERCIAL

## 2024-06-18 VITALS
TEMPERATURE: 97.6 F | WEIGHT: 238.8 LBS | SYSTOLIC BLOOD PRESSURE: 129 MMHG | HEIGHT: 64 IN | DIASTOLIC BLOOD PRESSURE: 87 MMHG | BODY MASS INDEX: 40.77 KG/M2

## 2024-06-18 DIAGNOSIS — J30.9 CHRONIC ALLERGIC RHINITIS: ICD-10-CM

## 2024-06-18 DIAGNOSIS — J32.0 CHRONIC MAXILLARY SINUSITIS: ICD-10-CM

## 2024-06-18 DIAGNOSIS — J34.2 DEVIATED NASAL SEPTUM: Primary | ICD-10-CM

## 2024-06-18 PROCEDURE — 99214 OFFICE O/P EST MOD 30 MIN: CPT | Performed by: OTOLARYNGOLOGY

## 2024-06-18 PROCEDURE — 3008F BODY MASS INDEX DOCD: CPT | Performed by: OTOLARYNGOLOGY

## 2024-06-18 PROCEDURE — 1036F TOBACCO NON-USER: CPT | Performed by: OTOLARYNGOLOGY

## 2024-06-18 NOTE — PROGRESS NOTES
Impression:  1. Deviated nasal septum        2. Chronic maxillary sinusitis        3. Chronic allergic rhinitis             RECOMMENDATIONS/PLAN :  I explained to the patient that her previous CT scan from January 2024 did show acute on chronic sinusitis and that she had a very small spur to the right from her septum however this is not causing any breathing issues and would not require any surgical intervention.  However I do want her to start flushing her sinuses using a sinus rinse kit 3-5 times per week and she will restart Flonase nasal spray-2 puffs each nostril daily to help open up her nose.  If she starts having back-to-back sinus infections, I would then consider an additional CT scan of the sinuses and possible surgical intervention at that point.      **This electronic medical record note was created with the use of voice recognition software.  Despite proofreading, typographical or grammatical errors may be present that could affect meaning of content **    Subjective   Patient ID:     Nelda Hewitt is a 53 y.o. female who presents to the office today concerned about a spur that she has along her septum.  This was found on a CT scan of her sinuses back in January 2024.  She had a bad sinus infection at that time and was given oral antibiotics.  She has not had any recent infections fever chills or pus draining from the sinuses.  Overall she is breathing fairly well through her nose.  She does have a history of allergies and she has had a previous turbinate surgery by Dr. Morrissey.     ROS:  A detailed 12 system review of systems is noted on the intake form has been reviewed with the patient with details noted in the HPI and scanned into the patient's medical record.    Objective     Past Medical History:   Diagnosis Date    Allergic     Atherosclerotic heart disease of native coronary artery without angina pectoris     Coronary artery disease involving native coronary artery, angina presence unspecified,  "unspecified whether native or transplanted heart    Personal history of other diseases of the respiratory system 05/18/2019    History of COPD        Past Surgical History:   Procedure Laterality Date    APPENDECTOMY      CHOLECYSTECTOMY      HYSTERECTOMY      due to DUB    OTHER SURGICAL HISTORY  05/18/2019    Oophorectomy    OTHER SURGICAL HISTORY  11/04/2019    Renal lithotripsy    TUBAL LIGATION          Allergies   Allergen Reactions    Doxycycline Nausea/vomiting    Levofloxacin Hives    Penicillins Hives    Vancomycin Rash          Current Outpatient Medications:     multivitamin tablet, Take 1 tablet by mouth once daily., Disp: , Rfl:     ipratropium (Atrovent) 21 mcg (0.03 %) nasal spray, Administer 2 sprays into each nostril every 12 hours., Disp: 30 mL, Rfl: 0     Tobacco Use: Medium Risk (6/18/2024)    Patient History     Smoking Tobacco Use: Former     Smokeless Tobacco Use: Never     Passive Exposure: Not on file        Alcohol Use: Unknown (5/10/2019)    Received from [facility]    AUDIT-C     Frequency of Alcohol Consumption: Monthly or less     Average Number of Drinks: 1 or 2     Frequency of Binge Drinking: Not on file        Social History     Substance and Sexual Activity   Drug Use Never        Physical Exam:  Visit Vitals  /87   Temp 36.4 °C (97.6 °F) (Temporal)   Ht 1.626 m (5' 4\")   Wt 108 kg (238 lb 12.8 oz)   BMI 40.99 kg/m²   OB Status Hysterectomy   Smoking Status Former   BSA 2.21 m²      General: Patient is alert, oriented, cooperative in no apparent distress.  Head: Normocephalic, atraumatic.  Eyes: PERRL, EOMI, Conjunctiva is clear. No nystagmus.  Ears: Right Ear-- Pinna is normal.  External auditory canal is patent. Tympanic membrane is [intact, translucent and has good mobility with my pneumatic otoscope. No effusion].  Mastoid is nontender.  Left ear-- Pinna is normal.  External auditory canal is patent. Tympanic membrane is [intact, translucent and " has good mobility with my pneumatic otoscope.  No effusion].  Mastoid is nontender.  Nose: Septum is relatively straight with a mild spur to the right.  No septal perforation or lesions. No septal hematoma/ seroma.  No signs of bleeding.  Inferior turbinates are normal.   No evidence of intranasal polyps.  No infectious drainage.  Throat:  Floor of mouth is clear, no masses.  Tongue appears normal, no lesions or masses. Gums, gingiva, buccal mucosa appear pink and moist, no lesions. Teeth are in good repair.  No obvious dental infections.  Peritonsillar regions appear symmetric without swelling.  Hard and soft palate appear normal, no obvious cleft. Uvula is midline.  Oropharynx: No lesions. Retropharyngeal wall is flat.  No active postnasal drip.  Neck: Supple,  no lymphadenopathy.  No masses.  Salivary Glands: Symmetric bilaterally.  No palpable masses.  No evidence of acute infection or salivary stones  Neurologic: Cranial Nerves 2-12 are grossly intact without focal deficits. Cerebellar function testing is normal.     Results:   I reviewed her previous CT results from January 2024.  She did have acute on chronic changes involving her right frontal, ethmoid sinuses and bilateral maxillary sinuses.  She has a mild spur to the right.  No bony destruction.    Procedure:   []    Elgin R Ngoc, DO

## 2024-06-24 ENCOUNTER — HOSPITAL ENCOUNTER (OUTPATIENT)
Dept: RADIOLOGY | Facility: HOSPITAL | Age: 54
Discharge: HOME | End: 2024-06-24
Payer: COMMERCIAL

## 2024-06-24 DIAGNOSIS — R91.8 MULTIPLE PULMONARY NODULES: ICD-10-CM

## 2024-06-24 PROCEDURE — 71250 CT THORAX DX C-: CPT

## 2024-06-24 PROCEDURE — 71250 CT THORAX DX C-: CPT | Performed by: RADIOLOGY

## 2024-06-27 DIAGNOSIS — R91.8 MULTIPLE PULMONARY NODULES: Primary | ICD-10-CM

## 2024-06-27 NOTE — RESULT ENCOUNTER NOTE
Nelda Hewitt was called and informed the lung CT did show a new nonsuspicious 1 cm nodule.  We discussed the possibility of referral to pulmonology to consider options or getting a 6-month follow-up CT as recommended by the radiologist.  She would like to pursue the latter which I think is a reasonable choice.  An order will be placed as a future order in her chart and a reminder set

## 2024-07-02 ENCOUNTER — NUTRITION (OUTPATIENT)
Dept: NUTRITION | Facility: CLINIC | Age: 54
End: 2024-07-02
Payer: COMMERCIAL

## 2024-07-02 VITALS — BODY MASS INDEX: 39.74 KG/M2 | HEIGHT: 65 IN

## 2024-07-02 DIAGNOSIS — E66.09 CLASS 2 OBESITY DUE TO EXCESS CALORIES WITHOUT SERIOUS COMORBIDITY WITH BODY MASS INDEX (BMI) OF 36.0 TO 36.9 IN ADULT: ICD-10-CM

## 2024-07-02 PROCEDURE — 97802 MEDICAL NUTRITION INDIV IN: CPT | Performed by: DIETITIAN, REGISTERED

## 2024-07-02 NOTE — PATIENT INSTRUCTIONS
Follow the Healthy Plate Method at meals- see handout.  This will help to increase your vegetable intake and decrease portion sizes of carbohydrates.  When eating starchy foods, try to eat whole grains like potato with the skin, whole grain breads and cereals, brown rices and pastas.  Include protein with all meals and snacks.  Lean protein are better for cholesterol levels such as chicken(no skin), fish (baked or broiled or grilled), low-fat dairy, eggs, and peanut butter or nuts.   - Protein drinks between meals such as Premier Protein, Muscle Milk or Fairlife can help curb appetite.  4.   Reduce your weight by 1-2# per week to reach your goal weight.      - Consider tracking intakes on an henry such as [employer] or [employer]ks.  Aim for 7313-1293 kcalss/day.     - Recommend a high protein afternoon snack.    5.   Increase fiber to 25-3g per day to help keep you oconnor and lower cholesterol levels.  You may consider a fiber supplement such as Benefiber or Metamucil everyday.    6.   Aim to drink 64 oz of water daily  7.   Aim for 30 minutes of exercise on most days.

## 2024-07-02 NOTE — PROGRESS NOTES
"Nutrition Assessment     Reason for Visit:  Nelda Hewitt is a 53 y.o. female who presents for nutrition counseling seeking weight loss    Anthropometrics:    Wt Readings from Last 10 Encounters:   06/18/24 108 kg (238 lb 12.8 oz)   04/22/24 102 kg (225 lb)   03/25/24 105 kg (232 lb)   09/25/23 104 kg (230 lb)   09/08/23 94.3 kg (208 lb)   08/28/23 103 kg (228 lb)   08/24/23 103 kg (228 lb)   05/03/23 100 kg (221 lb)   03/27/23 100 kg (221 lb)   12/06/22 103 kg (226 lb)     Labs: LDL- 114  Anthropometrics  Height: 165.1 cm (5' 5\")         Food And Nutrient Intake:  Food and Nutrient History  Food and Nutrient History: Pt present for nutrition counseling seeking weight loss.  Pt is feeling frustrated  with lack of weight loss.  Pt reports that she did the keto diet for 1 year and only lost 5#.  her BMI indiates classI obesity.  Pt does not have any other significant health issues.  LDL was mildly elevated.  Pt does not wish to have any weight loss medication.  She has difficulty with sleep and does not incoporate exercise due to fatigue.  Diet recall reveals pt only eats 2 meals per day.  Lunches are often chicken salad on lettuce or bread.   Dinner are a protein with vegetables.  Pt avoids most dairy but does like to eat cottage cheese.  Pt may benefit from tracking her intakes ot determin how many calories are consumed- aim for 5910-5746 calories per day to lose 1-2# per week.  Recommend more high protein snckas b/t melas, especially in the afternoon to help curb appetite.  Fluid Intake: 80 oz of water daily, coffee; soda- once per month- Dr. Pepper;  Food Intolerance: dairy- can eat silk yogurt; 1 slice of cheese ok  GI Symptoms:  (mix of loose BM and constipation)  Sleep Duration/Quality: 5-6 hrs disrupted     Food Intake  Meal 1: 9:00 coffee, half and half vanilla skinny syrup  Meal 2: lunch:  recipe- chicken salad or 1/2 sandwich- usually packs; pizza once or twice per month  Meal 3: Tuesday out; steak on " "corn on the cob; meat and potato or vegetable; pastas;  Snacks: popcorn; salty snacks cravings in the evening or a bowl of cereal         Micronutrient Intake  Vitamin Intake: D    Food Supplement Intake  Oral Nutrition Supplements: Premier Protein         Physical Activities:  Physical Activity  Type of Physical Activity: busy walking at work       Nutrition Focused Physical Exam:  Deferred- no malnutrition suspected.        Energy Needs  Calculated Energy Needs Using Equations  Height: 165.1 cm (5' 5\")  Weight Used for Equation Calculations: 108 kg (238 lb)  [place] Equation (Overweight or Obese Patients): 1690  Activity Factor: 1.2  Total Energy Needs: 2028  Estimated Energy Needs  Total Energy Estimated Needs (kCal): 1528 kCal  Method for Estimating Needs: -500        Nutrition Diagnosis   Malnutrition Diagnosis  Patient has Malnutrition Diagnosis: No  Nutrition Diagnosis  Patient has Nutrition Diagnosis: Yes  Diagnosis Status (1): New  Nutrition Diagnosis 1: Obese  Related to (1): Predicted excessive energy intake;  lack of physical activity  As Evidenced by (1): pt report; BMI; weight hx    Nutrition Interventions/Recommendations   Food and Nutrition Delivery  Meals & Snacks: Carbohydrate-modified diet, Energy-modified diet, Fiber-modified diet, General Healthful Diet, Protein-modified diet, Modify schedule of foods/fluids, Modify Composition of Meals/Snacks  Goals: 3270-0098 kclas/day; Healthy Plate method; High protein snacks  Medical Food Supplement: Premier Protein  Goals: - May have a daily protein shake or high protein snack in the afternoon.  Nutrition Education  Nutrition Education Content: Content related nutrition education, Education on nutrition's influence on health, Physical activity guidance  Goals: Instructed on counting macronutrient intake, proper portion sizes, label reading and general healthful nutrition. Instructed on benefits of wt loss. Discussed mindful eating, slow gradual " wt loss and goals beyond wt. Instructed pt to not skip meals - discussed this may lead to over eating later or snacking more than planned. Instructed on importance of physical activity for wt loss and maintenance of wt loss. Both verbal and written education provided in the one-on-one setting. Pt verbalized understanding of information provided. All questions answered to pt satisfaction throughout visit        There are no Patient Instructions on file for this visit.    Nutrition Monitoring and Evaluation   Food/Nutrient Related History Monitoring  Monitoring and Evaluation Plan: Energy intake, Meal/snack pattern, Fiber intake, Carbohydrate intake, Amount of food, Fluid intake, Protein intake  Energy Intake: Estimated energy intake  Criteria: 4961-7154 kcal/day- track on henry of choice  Criteria: aim for at least 64 oz of water daily  Criteria: Aim for 2-3 meals/day with 1-2 snack  Meal/Snack Pattern: Food variety, Estimated meal and snack pattern  Criteria: Follow plate method when planning meals (1/2 plate non-starchy vegetables, 1/4 plate lean protein, 1/4 plate carbohydrates).  Criteria: Choose lean protein sources including chicken, turkey, seafood, and eggs. Be sure to include protein with each meal and snack  Criteria: Limit added sugar to less than 25 g per day  Criteria: Increase fiber from fruits, vegetables, whole grains, and beans/lentils  Additional Plan: Provided pt with the following handouts: wt loss tips, high protein snack ideas; Dining Out; plate method  Knowledge/Beliefs/Attitudes  Monitoring and Evaluation Plan: Physical activity  Criteria: Encouraged regular physical activity including strength training as medically appropriate per AHA guidelines  Body Composition/Growth/Weight History  Monitoring and Evaluation Plan: Weight change  Weight Change: Weight change intent  Criteria: 1-2 lb wt loss per week        Readiness to Change : Good  Level of Understanding : Good  Anticipated Compliant :  Good

## 2024-07-05 ENCOUNTER — APPOINTMENT (OUTPATIENT)
Dept: CARDIOLOGY | Facility: HOSPITAL | Age: 54
DRG: 177 | End: 2024-07-05
Payer: COMMERCIAL

## 2024-07-05 ENCOUNTER — APPOINTMENT (OUTPATIENT)
Dept: RADIOLOGY | Facility: HOSPITAL | Age: 54
DRG: 177 | End: 2024-07-05
Payer: COMMERCIAL

## 2024-07-05 ENCOUNTER — HOSPITAL ENCOUNTER (INPATIENT)
Facility: HOSPITAL | Age: 54
DRG: 177 | End: 2024-07-05
Attending: EMERGENCY MEDICINE | Admitting: STUDENT IN AN ORGANIZED HEALTH CARE EDUCATION/TRAINING PROGRAM
Payer: COMMERCIAL

## 2024-07-05 DIAGNOSIS — J32.9 SINUSITIS, BACTERIAL: ICD-10-CM

## 2024-07-05 DIAGNOSIS — J18.9 PNEUMONIA DUE TO INFECTIOUS ORGANISM, UNSPECIFIED LATERALITY, UNSPECIFIED PART OF LUNG: Primary | ICD-10-CM

## 2024-07-05 DIAGNOSIS — B96.89 SINUSITIS, BACTERIAL: ICD-10-CM

## 2024-07-05 DIAGNOSIS — J96.01 ACUTE RESPIRATORY FAILURE WITH HYPOXIA (MULTI): ICD-10-CM

## 2024-07-05 LAB
ANION GAP SERPL CALC-SCNC: 14 MMOL/L (ref 10–20)
ATRIAL RATE: 116 BPM
BASOPHILS # BLD AUTO: 0.04 X10*3/UL (ref 0–0.1)
BASOPHILS NFR BLD AUTO: 0.2 %
BUN SERPL-MCNC: 6 MG/DL (ref 6–23)
CALCIUM SERPL-MCNC: 8.6 MG/DL (ref 8.6–10.3)
CARDIAC TROPONIN I PNL SERPL HS: 6 NG/L (ref 0–13)
CHLORIDE SERPL-SCNC: 102 MMOL/L (ref 98–107)
CO2 SERPL-SCNC: 24 MMOL/L (ref 21–32)
CREAT SERPL-MCNC: 0.58 MG/DL (ref 0.5–1.05)
EGFRCR SERPLBLD CKD-EPI 2021: >90 ML/MIN/1.73M*2
EOSINOPHIL # BLD AUTO: 0.08 X10*3/UL (ref 0–0.7)
EOSINOPHIL NFR BLD AUTO: 0.4 %
ERYTHROCYTE [DISTWIDTH] IN BLOOD BY AUTOMATED COUNT: 14.1 % (ref 11.5–14.5)
FLUAV RNA RESP QL NAA+PROBE: NOT DETECTED
FLUBV RNA RESP QL NAA+PROBE: NOT DETECTED
GLUCOSE SERPL-MCNC: 109 MG/DL (ref 74–99)
HCT VFR BLD AUTO: 36.9 % (ref 36–46)
HGB BLD-MCNC: 12.3 G/DL (ref 12–16)
IMM GRANULOCYTES # BLD AUTO: 0.13 X10*3/UL (ref 0–0.7)
IMM GRANULOCYTES NFR BLD AUTO: 0.6 % (ref 0–0.9)
LYMPHOCYTES # BLD AUTO: 2.22 X10*3/UL (ref 1.2–4.8)
LYMPHOCYTES NFR BLD AUTO: 10.1 %
MAGNESIUM SERPL-MCNC: 1.92 MG/DL (ref 1.6–2.4)
MCH RBC QN AUTO: 29.6 PG (ref 26–34)
MCHC RBC AUTO-ENTMCNC: 33.3 G/DL (ref 32–36)
MCV RBC AUTO: 89 FL (ref 80–100)
MONOCYTES # BLD AUTO: 1.03 X10*3/UL (ref 0.1–1)
MONOCYTES NFR BLD AUTO: 4.7 %
NEUTROPHILS # BLD AUTO: 18.42 X10*3/UL (ref 1.2–7.7)
NEUTROPHILS NFR BLD AUTO: 84 %
NRBC BLD-RTO: 0 /100 WBCS (ref 0–0)
P AXIS: 40 DEGREES
P OFFSET: 204 MS
P ONSET: 141 MS
PLATELET # BLD AUTO: 364 X10*3/UL (ref 150–450)
POTASSIUM SERPL-SCNC: 3.8 MMOL/L (ref 3.5–5.3)
PR INTERVAL: 166 MS
Q ONSET: 224 MS
QRS COUNT: 19 BEATS
QRS DURATION: 72 MS
QT INTERVAL: 294 MS
QTC CALCULATION(BAZETT): 408 MS
QTC FREDERICIA: 366 MS
R AXIS: 12 DEGREES
RBC # BLD AUTO: 4.16 X10*6/UL (ref 4–5.2)
RSV RNA RESP QL NAA+PROBE: NOT DETECTED
SARS-COV-2 RNA RESP QL NAA+PROBE: NOT DETECTED
SODIUM SERPL-SCNC: 136 MMOL/L (ref 136–145)
T AXIS: -55 DEGREES
T OFFSET: 371 MS
VENTRICULAR RATE: 116 BPM
WBC # BLD AUTO: 21.9 X10*3/UL (ref 4.4–11.3)

## 2024-07-05 PROCEDURE — 71275 CT ANGIOGRAPHY CHEST: CPT

## 2024-07-05 PROCEDURE — 93005 ELECTROCARDIOGRAM TRACING: CPT

## 2024-07-05 PROCEDURE — 83735 ASSAY OF MAGNESIUM: CPT | Performed by: EMERGENCY MEDICINE

## 2024-07-05 PROCEDURE — 2500000005 HC RX 250 GENERAL PHARMACY W/O HCPCS: Performed by: EMERGENCY MEDICINE

## 2024-07-05 PROCEDURE — 36415 COLL VENOUS BLD VENIPUNCTURE: CPT | Performed by: EMERGENCY MEDICINE

## 2024-07-05 PROCEDURE — 99285 EMERGENCY DEPT VISIT HI MDM: CPT | Mod: 25

## 2024-07-05 PROCEDURE — 80048 BASIC METABOLIC PNL TOTAL CA: CPT | Performed by: EMERGENCY MEDICINE

## 2024-07-05 PROCEDURE — 2500000004 HC RX 250 GENERAL PHARMACY W/ HCPCS (ALT 636 FOR OP/ED): Performed by: STUDENT IN AN ORGANIZED HEALTH CARE EDUCATION/TRAINING PROGRAM

## 2024-07-05 PROCEDURE — 94640 AIRWAY INHALATION TREATMENT: CPT

## 2024-07-05 PROCEDURE — 1200000002 HC GENERAL ROOM WITH TELEMETRY DAILY

## 2024-07-05 PROCEDURE — 2500000002 HC RX 250 W HCPCS SELF ADMINISTERED DRUGS (ALT 637 FOR MEDICARE OP, ALT 636 FOR OP/ED): Performed by: EMERGENCY MEDICINE

## 2024-07-05 PROCEDURE — 85025 COMPLETE CBC W/AUTO DIFF WBC: CPT | Performed by: EMERGENCY MEDICINE

## 2024-07-05 PROCEDURE — 71275 CT ANGIOGRAPHY CHEST: CPT | Mod: FOREIGN READ | Performed by: RADIOLOGY

## 2024-07-05 PROCEDURE — 99222 1ST HOSP IP/OBS MODERATE 55: CPT | Performed by: STUDENT IN AN ORGANIZED HEALTH CARE EDUCATION/TRAINING PROGRAM

## 2024-07-05 PROCEDURE — 2500000004 HC RX 250 GENERAL PHARMACY W/ HCPCS (ALT 636 FOR OP/ED): Performed by: EMERGENCY MEDICINE

## 2024-07-05 PROCEDURE — 2500000001 HC RX 250 WO HCPCS SELF ADMINISTERED DRUGS (ALT 637 FOR MEDICARE OP): Performed by: STUDENT IN AN ORGANIZED HEALTH CARE EDUCATION/TRAINING PROGRAM

## 2024-07-05 PROCEDURE — 87637 SARSCOV2&INF A&B&RSV AMP PRB: CPT | Performed by: EMERGENCY MEDICINE

## 2024-07-05 PROCEDURE — 71046 X-RAY EXAM CHEST 2 VIEWS: CPT

## 2024-07-05 PROCEDURE — 84484 ASSAY OF TROPONIN QUANT: CPT | Performed by: EMERGENCY MEDICINE

## 2024-07-05 PROCEDURE — 96365 THER/PROPH/DIAG IV INF INIT: CPT

## 2024-07-05 PROCEDURE — 96368 THER/DIAG CONCURRENT INF: CPT

## 2024-07-05 PROCEDURE — 2550000001 HC RX 255 CONTRASTS: Performed by: EMERGENCY MEDICINE

## 2024-07-05 PROCEDURE — 96375 TX/PRO/DX INJ NEW DRUG ADDON: CPT

## 2024-07-05 RX ORDER — ACETAMINOPHEN 325 MG/1
650 TABLET ORAL EVERY 4 HOURS PRN
Status: DISCONTINUED | OUTPATIENT
Start: 2024-07-05 | End: 2024-07-09 | Stop reason: HOSPADM

## 2024-07-05 RX ORDER — ENOXAPARIN SODIUM 100 MG/ML
40 INJECTION SUBCUTANEOUS EVERY 24 HOURS
Status: DISCONTINUED | OUTPATIENT
Start: 2024-07-05 | End: 2024-07-09 | Stop reason: HOSPADM

## 2024-07-05 RX ORDER — ONDANSETRON HYDROCHLORIDE 2 MG/ML
4 INJECTION, SOLUTION INTRAVENOUS EVERY 8 HOURS PRN
Status: DISCONTINUED | OUTPATIENT
Start: 2024-07-05 | End: 2024-07-09 | Stop reason: HOSPADM

## 2024-07-05 RX ORDER — ERTAPENEM 1 G/1
1 INJECTION, POWDER, LYOPHILIZED, FOR SOLUTION INTRAMUSCULAR; INTRAVENOUS ONCE
Status: COMPLETED | OUTPATIENT
Start: 2024-07-05 | End: 2024-07-05

## 2024-07-05 RX ORDER — PANTOPRAZOLE SODIUM 40 MG/10ML
40 INJECTION, POWDER, LYOPHILIZED, FOR SOLUTION INTRAVENOUS
Status: DISCONTINUED | OUTPATIENT
Start: 2024-07-06 | End: 2024-07-09 | Stop reason: HOSPADM

## 2024-07-05 RX ORDER — ACETAMINOPHEN 650 MG/1
650 SUPPOSITORY RECTAL EVERY 4 HOURS PRN
Status: DISCONTINUED | OUTPATIENT
Start: 2024-07-05 | End: 2024-07-09 | Stop reason: HOSPADM

## 2024-07-05 RX ORDER — ACETAMINOPHEN 325 MG/1
975 TABLET ORAL ONCE
Status: COMPLETED | OUTPATIENT
Start: 2024-07-05 | End: 2024-07-05

## 2024-07-05 RX ORDER — MAGNESIUM SULFATE HEPTAHYDRATE 40 MG/ML
2 INJECTION, SOLUTION INTRAVENOUS ONCE
Status: COMPLETED | OUTPATIENT
Start: 2024-07-05 | End: 2024-07-05

## 2024-07-05 RX ORDER — TALC
3 POWDER (GRAM) TOPICAL DAILY
Status: DISCONTINUED | OUTPATIENT
Start: 2024-07-05 | End: 2024-07-09 | Stop reason: HOSPADM

## 2024-07-05 RX ORDER — ACETAMINOPHEN 160 MG/5ML
650 SOLUTION ORAL EVERY 4 HOURS PRN
Status: DISCONTINUED | OUTPATIENT
Start: 2024-07-05 | End: 2024-07-09 | Stop reason: HOSPADM

## 2024-07-05 RX ORDER — ONDANSETRON 4 MG/1
4 TABLET, ORALLY DISINTEGRATING ORAL EVERY 8 HOURS PRN
Status: DISCONTINUED | OUTPATIENT
Start: 2024-07-05 | End: 2024-07-09 | Stop reason: HOSPADM

## 2024-07-05 RX ORDER — GUAIFENESIN/DEXTROMETHORPHAN 100-10MG/5
5 SYRUP ORAL EVERY 4 HOURS PRN
Status: DISCONTINUED | OUTPATIENT
Start: 2024-07-05 | End: 2024-07-09 | Stop reason: HOSPADM

## 2024-07-05 RX ORDER — DOCUSATE SODIUM 100 MG/1
100 CAPSULE, LIQUID FILLED ORAL 2 TIMES DAILY
Status: DISCONTINUED | OUTPATIENT
Start: 2024-07-05 | End: 2024-07-09 | Stop reason: HOSPADM

## 2024-07-05 RX ORDER — DEXAMETHASONE SODIUM PHOSPHATE 10 MG/ML
6 INJECTION INTRAMUSCULAR; INTRAVENOUS ONCE
Status: COMPLETED | OUTPATIENT
Start: 2024-07-05 | End: 2024-07-05

## 2024-07-05 RX ORDER — ONDANSETRON HYDROCHLORIDE 2 MG/ML
4 INJECTION, SOLUTION INTRAVENOUS ONCE
Status: COMPLETED | OUTPATIENT
Start: 2024-07-05 | End: 2024-07-05

## 2024-07-05 RX ORDER — PANTOPRAZOLE SODIUM 40 MG/1
40 TABLET, DELAYED RELEASE ORAL
Status: DISCONTINUED | OUTPATIENT
Start: 2024-07-06 | End: 2024-07-09 | Stop reason: HOSPADM

## 2024-07-05 RX ORDER — MEROPENEM 1 G/1
1 INJECTION, POWDER, FOR SOLUTION INTRAVENOUS EVERY 8 HOURS
Status: DISCONTINUED | OUTPATIENT
Start: 2024-07-05 | End: 2024-07-09 | Stop reason: HOSPADM

## 2024-07-05 RX ORDER — IPRATROPIUM BROMIDE AND ALBUTEROL SULFATE 2.5; .5 MG/3ML; MG/3ML
3 SOLUTION RESPIRATORY (INHALATION) EVERY 20 MIN
Status: COMPLETED | OUTPATIENT
Start: 2024-07-05 | End: 2024-07-05

## 2024-07-05 RX ORDER — IPRATROPIUM BROMIDE AND ALBUTEROL SULFATE 2.5; .5 MG/3ML; MG/3ML
3 SOLUTION RESPIRATORY (INHALATION) EVERY 6 HOURS PRN
Status: DISCONTINUED | OUTPATIENT
Start: 2024-07-05 | End: 2024-07-09 | Stop reason: HOSPADM

## 2024-07-05 SDOH — SOCIAL STABILITY: SOCIAL INSECURITY: HAVE YOU HAD THOUGHTS OF HARMING ANYONE ELSE?: NO

## 2024-07-05 SDOH — SOCIAL STABILITY: SOCIAL INSECURITY: DO YOU FEEL ANYONE HAS EXPLOITED OR TAKEN ADVANTAGE OF YOU FINANCIALLY OR OF YOUR PERSONAL PROPERTY?: NO

## 2024-07-05 SDOH — SOCIAL STABILITY: SOCIAL INSECURITY: DOES ANYONE TRY TO KEEP YOU FROM HAVING/CONTACTING OTHER FRIENDS OR DOING THINGS OUTSIDE YOUR HOME?: NO

## 2024-07-05 SDOH — SOCIAL STABILITY: SOCIAL INSECURITY: ABUSE: ADULT

## 2024-07-05 SDOH — SOCIAL STABILITY: SOCIAL INSECURITY: ARE THERE ANY APPARENT SIGNS OF INJURIES/BEHAVIORS THAT COULD BE RELATED TO ABUSE/NEGLECT?: NO

## 2024-07-05 SDOH — SOCIAL STABILITY: SOCIAL INSECURITY: ARE YOU OR HAVE YOU BEEN THREATENED OR ABUSED PHYSICALLY, EMOTIONALLY, OR SEXUALLY BY ANYONE?: NO

## 2024-07-05 SDOH — SOCIAL STABILITY: SOCIAL INSECURITY: DO YOU FEEL UNSAFE GOING BACK TO THE PLACE WHERE YOU ARE LIVING?: NO

## 2024-07-05 SDOH — SOCIAL STABILITY: SOCIAL INSECURITY: WERE YOU ABLE TO COMPLETE ALL THE BEHAVIORAL HEALTH SCREENINGS?: YES

## 2024-07-05 SDOH — SOCIAL STABILITY: SOCIAL INSECURITY: HAS ANYONE EVER THREATENED TO HURT YOUR FAMILY OR YOUR PETS?: NO

## 2024-07-05 SDOH — SOCIAL STABILITY: SOCIAL INSECURITY: HAVE YOU HAD ANY THOUGHTS OF HARMING ANYONE ELSE?: NO

## 2024-07-05 ASSESSMENT — ACTIVITIES OF DAILY LIVING (ADL)
TOILETING: INDEPENDENT
GROOMING: INDEPENDENT
ASSISTIVE_DEVICE: EYEGLASSES;DENTURES PARTIAL
FEEDING YOURSELF: INDEPENDENT
JUDGMENT_ADEQUATE_SAFELY_COMPLETE_DAILY_ACTIVITIES: YES
HEARING - RIGHT EAR: FUNCTIONAL
LACK_OF_TRANSPORTATION: NO
DRESSING YOURSELF: INDEPENDENT
PATIENT'S MEMORY ADEQUATE TO SAFELY COMPLETE DAILY ACTIVITIES?: YES
HEARING - LEFT EAR: FUNCTIONAL
WALKS IN HOME: INDEPENDENT
ADEQUATE_TO_COMPLETE_ADL: YES
BATHING: INDEPENDENT

## 2024-07-05 ASSESSMENT — COGNITIVE AND FUNCTIONAL STATUS - GENERAL
MOBILITY SCORE: 24
DAILY ACTIVITIY SCORE: 24
DAILY ACTIVITIY SCORE: 24
MOBILITY SCORE: 24
PATIENT BASELINE BEDBOUND: NO

## 2024-07-05 ASSESSMENT — PAIN SCALES - GENERAL
PAINLEVEL_OUTOF10: 0 - NO PAIN
PAINLEVEL_OUTOF10: 2
PAINLEVEL_OUTOF10: 5 - MODERATE PAIN

## 2024-07-05 ASSESSMENT — LIFESTYLE VARIABLES
EVER FELT BAD OR GUILTY ABOUT YOUR DRINKING: NO
HOW OFTEN DO YOU HAVE 6 OR MORE DRINKS ON ONE OCCASION: WEEKLY
AUDIT-C TOTAL SCORE: 5
SKIP TO QUESTIONS 9-10: 0
HOW MANY STANDARD DRINKS CONTAINING ALCOHOL DO YOU HAVE ON A TYPICAL DAY: 1 OR 2
HAVE PEOPLE ANNOYED YOU BY CRITICIZING YOUR DRINKING: NO
TOTAL SCORE: 0
AUDIT-C TOTAL SCORE: 5
HAVE YOU EVER FELT YOU SHOULD CUT DOWN ON YOUR DRINKING: NO
EVER HAD A DRINK FIRST THING IN THE MORNING TO STEADY YOUR NERVES TO GET RID OF A HANGOVER: NO
HOW OFTEN DO YOU HAVE A DRINK CONTAINING ALCOHOL: 2-4 TIMES A MONTH

## 2024-07-05 ASSESSMENT — PAIN - FUNCTIONAL ASSESSMENT
PAIN_FUNCTIONAL_ASSESSMENT: 0-10
PAIN_FUNCTIONAL_ASSESSMENT: 0-10

## 2024-07-05 ASSESSMENT — PAIN DESCRIPTION - PAIN TYPE
TYPE: ACUTE PAIN
TYPE: ACUTE PAIN

## 2024-07-05 ASSESSMENT — PATIENT HEALTH QUESTIONNAIRE - PHQ9
2. FEELING DOWN, DEPRESSED OR HOPELESS: NOT AT ALL
1. LITTLE INTEREST OR PLEASURE IN DOING THINGS: SEVERAL DAYS
SUM OF ALL RESPONSES TO PHQ9 QUESTIONS 1 & 2: 1

## 2024-07-05 ASSESSMENT — PAIN DESCRIPTION - DIRECTION: RADIATING_TOWARDS: GENERALIZED

## 2024-07-05 ASSESSMENT — PAIN DESCRIPTION - LOCATION
LOCATION: CHEST
LOCATION: OTHER (COMMENT)

## 2024-07-05 ASSESSMENT — PAIN DESCRIPTION - ORIENTATION: ORIENTATION: RIGHT

## 2024-07-05 NOTE — CARE PLAN
Problem: Pain - Adult  Goal: Verbalizes/displays adequate comfort level or baseline comfort level  Outcome: Progressing     Problem: Safety - Adult  Goal: Free from fall injury  Outcome: Progressing     Problem: Discharge Planning  Goal: Discharge to home or other facility with appropriate resources  Outcome: Progressing     Problem: Chronic Conditions and Co-morbidities  Goal: Patient's chronic conditions and co-morbidity symptoms are monitored and maintained or improved  Outcome: Progressing     Problem: Pain  Goal: Takes deep breaths with improved pain control throughout the shift  Outcome: Progressing  Goal: Turns in bed with improved pain control throughout the shift  Outcome: Progressing  Goal: Walks with improved pain control throughout the shift  Outcome: Progressing  Goal: Performs ADL's with improved pain control throughout shift  Outcome: Progressing  Goal: Participates in PT with improved pain control throughout the shift  Outcome: Progressing  Goal: Free from opioid side effects throughout the shift  Outcome: Progressing  Goal: Free from acute confusion related to pain meds throughout the shift  Outcome: Progressing   The patient's goals for the shift include to rest    The clinical goals for the shift include maintain spo2 >94%

## 2024-07-05 NOTE — ED PROVIDER NOTES
HPI   Chief Complaint   Patient presents with    Flu Symptoms     States she had a double ear infection and it has just been getting worse       HPI: 53-year-old female states that she had bilateral ear infections about 2 weeks ago.  She was on Ceftin.  She states that now she feels like it is in her chest.  She states that she is feels ill all over.  She states that she has a productive cough.  She denies any chest pain.  She states that she is aching all over.  She states that she has multiple antibiotic allergies which is why they gave her cefdinir.  She states that she is specifically allergic to doxycycline Levaquin and penicillins and vancomycin.  She denies any swelling in her legs.  She states that she does get short of breath with exertion.  She states that her ears actually feel better.    Family HX: Denies any significant/pertinent family history.  Social Hx: Denies ETOH or drug use.  Review of systems:  Gen.: No weight loss, fatigue, anorexia, insomnia, fever.   Eyes: No vision loss, double vision, drainage, eye pain.   ENT: No pharyngitis, dry mouth.   Cardiac: No palpitations, syncope, near syncope.   Pulmonary: No hemoptysis.   Heme/lymph: No swollen glands, fever, bleeding.   GI: No abdominal pain, change in bowel habits, melena, hematemesis, hematochezia, nausea, vomiting, diarrhea.   : No discharge, dysuria, frequency, urgency, hematuria.   Musculoskeletal: No limb pain, joint pain, joint swelling.   Skin: No rashes.   Psych: No depression, anxiety, suicidality, homicidality.   Review of systems is otherwise negative unless stated above or in history of present illness.    Medical Decision-Making:  Testing: EKG was obtained which is interpreted by me shows a sinus tach rate of 116 without evidence of obvious ST elevations or T wave inversions to indicate acute ischemia or infarct.  She does have some nonspecific ST-T wave changes.  Patient was found to be hypoxemic.  She was placed on 2 L.  She  was given IV fluids.  She was given Tylenol for the fever of 100.6.  Labs are reviewed.  She does have a significant leukocytosis of 21.  Chest x-ray interpreted by me is concerning for a right lower lobe pneumonia.  Patient does have multiple allergies to antibiotics.  We will give her azithromycin and meropenem.  Patient had a CT scan done to rule out a PE.  They do not see a PE but they do see a large consolidation concerning for abscess.  I did speak to medicine.  Medicine also consulted pulmonology.  Patient will be admitted.  Treatment:   Reevaluation:   Plan: Homegoing. Discussed differential. Will follow-up with the primary physician in the next 2-3 days. Return if worse. They understand return precautions and discharge instructions. Patient and family/friend/caregiver are in agreement with this plan.   Impression:   1.  Pneumonia  2.  Hypoxemia      Physical Exam:    Appearance: Alert, oriented , cooperative,  in no acute distress. Well nourished & well hydrated.    Skin: Intact,  dry skin, no lesions, rash, petechiae or purpura.     Eyes: PERRLA, EOMs intact,  Conjunctiva pink with no redness or exudates. Eyelids without lesions. No scleral icterus.     ENT: Hearing grossly intact. External auditory canals patent, tympanic membranes intact with visible landmarks. Nares patent, mucus membranes moist. Dentition without lesions. Pharynx clear, uvula midline.     Neck: Supple, without meningismus. Thyroid not palpable. Trachea at midline. No lymphadenopathy.    Pulmonary: Rhonchi right greater than left no accessory muscle use or stridor.    Cardiac: Tachycardic without murmur, rub, gallop or extrasystole. No JVD, Carotids without bruits.    Abdomen: Soft, nontender, active bowel sounds.  No palpable organomegaly.  No rebound or guarding.  No CVA tenderness.    Genitourinary: Exam deferred.    Musculoskeletal: Full range of motion. no pain, edema, or deformity. Pulses full and equal. No cyanosis, clubbing, or  edema.    Neurological:  Cranial nerves II through XII are grossly intact, finger-nose touch is normal, normal sensation, no weakness, no focal findings identified.    Psychiatric: Appropriate mood and affect.       Labs Reviewed  CBC WITH AUTO DIFFERENTIAL - Abnormal     WBC                           21.9 (*)               nRBC                          0.0                    RBC                           4.16                   Hemoglobin                    12.3                   Hematocrit                    36.9                   MCV                           89                     MCH                           29.6                   MCHC                          33.3                   RDW                           14.1                   Platelets                     364                    Neutrophils %                 84.0                   Immature Granulocytes %, Automated   0.6                    Lymphocytes %                 10.1                   Monocytes %                   4.7                    Eosinophils %                 0.4                    Basophils %                   0.2                    Neutrophils Absolute          18.42 (*)               Immature Granulocytes Absolute, Au*   0.13                   Lymphocytes Absolute          2.22                   Monocytes Absolute            1.03 (*)               Eosinophils Absolute          0.08                   Basophils Absolute            0.04                BASIC METABOLIC PANEL - Abnormal     Glucose                       109 (*)                Sodium                        136                    Potassium                     3.8                    Chloride                      102                    Bicarbonate                   24                     Anion Gap                     14                     Urea Nitrogen                 6                      Creatinine                    0.58                   eGFR                          >90                     Calcium                       8.6                 SARS-COV-2 PCR - Normal     Coronavirus 2019, PCR                                  Narrative: This assay has received FDA Emergency Use Authorization (EUA) and is only authorized for the duration of time that circumstances exist to justify the authorization of the emergency use of in vitro diagnostic tests for the detection of SARS-CoV-2 virus and/or diagnosis of COVID-19 infection under section 564(b)(1) of the Act, 21 U.S.C. 360bbb-3(b)(1). This assay is an in vitro diagnostic nucleic acid amplification test for the qualitative detection of SARS-CoV-2 from nasopharyngeal specimens and has been validated for use at [facility]. Negative results do not preclude COVID-19 infections and should not be used as the sole basis for diagnosis, treatment, or other management decisions.                  INFLUENZA A AND B PCR - Normal     Flu A Result                                         Flu B Result                                           Narrative: This assay is an in vitro diagnostic multiplex nucleic acid amplification test for the detection and discrimination of Influenza A & B from nasopharyngeal specimens, and has been validated for use at [facility]. Negative results do not preclude Influenza A/B infections, and should not be used as the sole basis for diagnosis, treatment, or other management decisions. If Influenza A/B and RSV PCR results are negative, testing for Parainfluenza virus, Adenovirus and Metapneumovirus is routinely performed for [facility] pediatric oncology and intensive care inpatients, and is available on other patients by placing an add-on request.  RSV PCR - Normal     RSV PCR                                                Narrative: This assay is an FDA-cleared, in vitro diagnostic nucleic acid amplification test for the detection of RSV from nasopharyngeal specimens, and has been  validated for use at [facility]. Negative results do not preclude RSV infections, and should not be used as the sole basis for diagnosis, treatment, or other management decisions. If Influenza A/B and RSV PCR results are negative, testing for Parainfluenza virus, Adenovirus and Metapneumovirus is routinely performed for pediatric oncology and intensive care inpatients at [facility], and is available on other patients by placing an add-on request.                                  TROPONIN I, HIGH SENSITIVITY - Normal     Troponin I, High Sensitivity   6                        Narrative: Less than 99th percentile of normal range cutoff-                Female and children under 18 years old <14 ng/L; Male <21 ng/L: Negative                Repeat testing should be performed if clinically indicated.                                 Female and children under 18 years old 14-50 ng/L; Male 21-50 ng/L:                Consistent with possible cardiac damage and possible increased clinical                 risk. Serial measurements may help to assess extent of myocardial damage.                                 >50 ng/L: Consistent with cardiac damage, increased clinical risk and                myocardial infarction. Serial measurements may help assess extent of                 myocardial damage.                                  NOTE: Children less than 1 year old may have higher baseline troponin                 levels and results should be interpreted in conjunction with the overall                 clinical context.                                 NOTE: Troponin I testing is performed using a different                 testing methodology at [facility] than at other                 [facility]. Direct result comparisons should only                 be made within the same method.  MAGNESIUM - Normal     CT angio chest for pulmonary embolism   Final Result    Addendum (preliminary) 1 of 1      Petty Watson is aware of the findings on 7/5/2024 at 4:08 PM    Signed by William Grimes MD       Final    1. No pulmonary emboli.    2. There is prominent consolidation in the right lower lobe. Within    this area of consolidation, there is a rounded lesion with an    air-fluid level. This may represent a lung abscess.    Signed by William Grimes MD     XR chest 2 views   Final Result    Moderate elevation of the right hemidiaphragm with right basilar    atelectasis. Underlying right pleural effusion not excluded.    Signed by Juan Jose Metzger MD                                No data recorded                   Patient History   Past Medical History:   Diagnosis Date    Allergic     Atherosclerotic heart disease of native coronary artery without angina pectoris     Coronary artery disease involving native coronary artery, angina presence unspecified, unspecified whether native or transplanted heart    Personal history of other diseases of the respiratory system 05/18/2019    History of COPD     Past Surgical History:   Procedure Laterality Date    APPENDECTOMY      CHOLECYSTECTOMY      HYSTERECTOMY      due to DUB    OTHER SURGICAL HISTORY  05/18/2019    Oophorectomy    OTHER SURGICAL HISTORY  11/04/2019    Renal lithotripsy    TUBAL LIGATION       Family History   Problem Relation Name Age of Onset    COPD Father Tc Cutlip     Emphysema Father Tc Cutlip     Breast cancer Maternal Grandmother Daria Cherry     Diabetes Maternal Grandmother Daria Cherry     Heart attack Maternal Grandfather       Social History     Tobacco Use    Smoking status: Former     Current packs/day: 0.00     Average packs/day: 1 pack/day for 30.0 years (30.0 ttl pk-yrs)     Types: Cigarettes     Start date: 1/1/1984     Quit date: 1/1/2014     Years since quitting: 10.5    Smokeless tobacco: Never   Vaping Use    Vaping status: Never Used   Substance Use Topics    Alcohol use: Not Currently     Alcohol/week: 2.0 standard drinks of  alcohol     Types: 2 Standard drinks or equivalent per week    Drug use: Never       Physical Exam   ED Triage Vitals   Temperature Heart Rate Respirations BP   07/05/24 1150 07/05/24 1150 07/05/24 1149 07/05/24 1150   (!) 38.1 °C (100.6 °F) (!) 119 18 138/72      Pulse Ox Temp Source Heart Rate Source Patient Position   07/05/24 1150 07/05/24 1150 07/05/24 1150 --   (!) 91 % Temporal Monitor       BP Location FiO2 (%)     -- --             Physical Exam    ED Course & MDM   Diagnoses as of 07/05/24 1619   Pneumonia due to infectious organism, unspecified laterality, unspecified part of lung   Acute respiratory failure with hypoxia (Multi)       Medical Decision Making      Procedure  Procedures     Petty Watson MD  07/05/24 161

## 2024-07-06 LAB
ALBUMIN SERPL BCP-MCNC: 3.2 G/DL (ref 3.4–5)
ALP SERPL-CCNC: 80 U/L (ref 33–110)
ALT SERPL W P-5'-P-CCNC: 21 U/L (ref 7–45)
ANION GAP SERPL CALC-SCNC: 11 MMOL/L (ref 10–20)
AST SERPL W P-5'-P-CCNC: 8 U/L (ref 9–39)
BILIRUB SERPL-MCNC: 0.3 MG/DL (ref 0–1.2)
BUN SERPL-MCNC: 12 MG/DL (ref 6–23)
CALCIUM SERPL-MCNC: 8.6 MG/DL (ref 8.6–10.3)
CHLORIDE SERPL-SCNC: 105 MMOL/L (ref 98–107)
CO2 SERPL-SCNC: 26 MMOL/L (ref 21–32)
CREAT SERPL-MCNC: 0.57 MG/DL (ref 0.5–1.05)
EGFRCR SERPLBLD CKD-EPI 2021: >90 ML/MIN/1.73M*2
ERYTHROCYTE [DISTWIDTH] IN BLOOD BY AUTOMATED COUNT: 14 % (ref 11.5–14.5)
GLUCOSE SERPL-MCNC: 156 MG/DL (ref 74–99)
HCT VFR BLD AUTO: 34 % (ref 36–46)
HGB BLD-MCNC: 11.1 G/DL (ref 12–16)
HOLD SPECIMEN: NORMAL
MCH RBC QN AUTO: 29.2 PG (ref 26–34)
MCHC RBC AUTO-ENTMCNC: 32.6 G/DL (ref 32–36)
MCV RBC AUTO: 90 FL (ref 80–100)
NRBC BLD-RTO: 0 /100 WBCS (ref 0–0)
PLATELET # BLD AUTO: 378 X10*3/UL (ref 150–450)
POTASSIUM SERPL-SCNC: 3.9 MMOL/L (ref 3.5–5.3)
PROT SERPL-MCNC: 6.8 G/DL (ref 6.4–8.2)
RBC # BLD AUTO: 3.8 X10*6/UL (ref 4–5.2)
SODIUM SERPL-SCNC: 138 MMOL/L (ref 136–145)
WBC # BLD AUTO: 17.9 X10*3/UL (ref 4.4–11.3)

## 2024-07-06 PROCEDURE — 76937 US GUIDE VASCULAR ACCESS: CPT

## 2024-07-06 PROCEDURE — 2500000002 HC RX 250 W HCPCS SELF ADMINISTERED DRUGS (ALT 637 FOR MEDICARE OP, ALT 636 FOR OP/ED): Performed by: STUDENT IN AN ORGANIZED HEALTH CARE EDUCATION/TRAINING PROGRAM

## 2024-07-06 PROCEDURE — 94667 MNPJ CHEST WALL 1ST: CPT

## 2024-07-06 PROCEDURE — 94640 AIRWAY INHALATION TREATMENT: CPT

## 2024-07-06 PROCEDURE — 1200000002 HC GENERAL ROOM WITH TELEMETRY DAILY

## 2024-07-06 PROCEDURE — 85027 COMPLETE CBC AUTOMATED: CPT | Performed by: STUDENT IN AN ORGANIZED HEALTH CARE EDUCATION/TRAINING PROGRAM

## 2024-07-06 PROCEDURE — 80053 COMPREHEN METABOLIC PANEL: CPT | Performed by: STUDENT IN AN ORGANIZED HEALTH CARE EDUCATION/TRAINING PROGRAM

## 2024-07-06 PROCEDURE — 2500000005 HC RX 250 GENERAL PHARMACY W/O HCPCS: Performed by: EMERGENCY MEDICINE

## 2024-07-06 PROCEDURE — 2500000004 HC RX 250 GENERAL PHARMACY W/ HCPCS (ALT 636 FOR OP/ED): Performed by: STUDENT IN AN ORGANIZED HEALTH CARE EDUCATION/TRAINING PROGRAM

## 2024-07-06 PROCEDURE — 36415 COLL VENOUS BLD VENIPUNCTURE: CPT | Performed by: STUDENT IN AN ORGANIZED HEALTH CARE EDUCATION/TRAINING PROGRAM

## 2024-07-06 PROCEDURE — 99232 SBSQ HOSP IP/OBS MODERATE 35: CPT | Performed by: STUDENT IN AN ORGANIZED HEALTH CARE EDUCATION/TRAINING PROGRAM

## 2024-07-06 PROCEDURE — 2500000001 HC RX 250 WO HCPCS SELF ADMINISTERED DRUGS (ALT 637 FOR MEDICARE OP): Performed by: STUDENT IN AN ORGANIZED HEALTH CARE EDUCATION/TRAINING PROGRAM

## 2024-07-06 RX ORDER — ACETYLCYSTEINE 200 MG/ML
3 SOLUTION ORAL; RESPIRATORY (INHALATION)
Status: DISCONTINUED | OUTPATIENT
Start: 2024-07-06 | End: 2024-07-09 | Stop reason: HOSPADM

## 2024-07-06 RX ORDER — IPRATROPIUM BROMIDE AND ALBUTEROL SULFATE 2.5; .5 MG/3ML; MG/3ML
3 SOLUTION RESPIRATORY (INHALATION)
Status: DISCONTINUED | OUTPATIENT
Start: 2024-07-06 | End: 2024-07-09 | Stop reason: HOSPADM

## 2024-07-06 ASSESSMENT — COGNITIVE AND FUNCTIONAL STATUS - GENERAL
DAILY ACTIVITIY SCORE: 24
MOBILITY SCORE: 24
MOBILITY SCORE: 24
DAILY ACTIVITIY SCORE: 24

## 2024-07-06 ASSESSMENT — PAIN SCALES - GENERAL
PAINLEVEL_OUTOF10: 0 - NO PAIN
PAINLEVEL_OUTOF10: 3
PAINLEVEL_OUTOF10: 0 - NO PAIN
PAINLEVEL_OUTOF10: 2

## 2024-07-06 ASSESSMENT — PAIN SCALES - WONG BAKER: WONGBAKER_NUMERICALRESPONSE: NO HURT

## 2024-07-06 ASSESSMENT — PAIN DESCRIPTION - LOCATION
LOCATION: HEAD
LOCATION: HEAD

## 2024-07-06 ASSESSMENT — PAIN SCALES - PAIN ASSESSMENT IN ADVANCED DEMENTIA (PAINAD): TOTALSCORE: MEDICATION (SEE MAR)

## 2024-07-06 NOTE — H&P
Medical Group History and Physical  ASSESSMENT & PLAN:     # Pneumonia   # pulmonary nodules with known cyst vs abscess  - placed patient on meropenem and azithromycin due to abx allergies  -duonebs initiated  - pulmonology consulted.   - will consult ID   - continue with supportive care.   - continue home meds as allowed  - telemetry    VTE ppx: lovenox  Diet: cardiac   Code status:  full code     >55 minutes  were spent preparing to see patient, obtaining and reviewing history, performing appropriate examination and/or evaluation, counseling and educating the patient family or caregiver, ordering medications tests or procedures, referring and communicating with other healthcare professionals, documenting clinical information in the EHR, independently interpreting results and communicating results to the patient, family or caregiver, care coordination.  > 50% of time spent counseling and/ or coordinating care      ---Of note, this documentation is completed using the Dragon Dictation system (voice recognition software). There may be spelling and/or grammatical errors that were not corrected prior to final submission.---    Sahra Mc MD    HISTORY OF PRESENT ILLNESS:   Chief Complaint: Pneumonia     History Of Present Illness:    Nelda Hewitt is a 53 y.o. female with a significant past medical history of era infections, pulmonary nodules,  and hyperglycemia who states that she had bilateral ear infections about 2 weeks ago. She was on Ceftin. She states that now she feels like it is in her chest. She states that she is feels ill all over. She states that she has a productive cough. She denies any chest pain. She states that she is aching all over. She states that she has multiple antibiotic allergies which is why they gave her cefdinir. She states that she is specifically allergic to doxycycline Levaquin and penicillins and vancomycin. She denies any swelling in her legs. She states that she does get  short of breath with exertion. She states that her ears actually feel better.     In the ER  patient had a temp of 38.1C and .  Labs significant for WBC 21.9.  CT chest showed prominent consolidation in the right lower lobe with a rounded lesion with an air-fluid level concerning for lung abscess.       Review of systems: 10 point review of systems is otherwise negative except as mentioned above.    PAST HISTORIES:     Past Medical History:  She has a past medical history of Allergic, Atherosclerotic heart disease of native coronary artery without angina pectoris, and Personal history of other diseases of the respiratory system (05/18/2019).    Past Surgical History:  She has a past surgical history that includes Other surgical history (05/18/2019); Other surgical history (11/04/2019); Hysterectomy; Tubal ligation; Cholecystectomy; and Appendectomy.      Social History:  She reports that she quit smoking about 10 years ago. Her smoking use included cigarettes. She started smoking about 40 years ago. She has a 30 pack-year smoking history. She has never used smokeless tobacco. She reports that she does not currently use alcohol after a past usage of about 2.0 standard drinks of alcohol per week. She reports that she does not use drugs.    Family History:  Family History   Problem Relation Name Age of Onset    COPD Father Tc Cutlip     Emphysema Father Tc Cutlip     Breast cancer Maternal Grandmother Daria Cherry     Diabetes Maternal Grandmother Daria Cherry     Heart attack Maternal Grandfather          Allergies:  Doxycycline, Levofloxacin, Penicillins, and Vancomycin    OBJECTIVE:     Last Recorded Vitals:  Vitals:    07/05/24 1155 07/05/24 1447 07/05/24 1656 07/05/24 2041   BP:  105/68 106/59 121/58   BP Location:   Left arm Left arm   Patient Position:   Lying Lying   Pulse:  (!) 106 105 88   Resp:  18 18 16   Temp:   36.2 °C (97.2 °F) 36.1 °C (97 °F)   TempSrc:   Temporal Temporal   SpO2: (!) 93%  96% 95% 93%   Weight:       Height:         Last I/O:  I/O last 3 completed shifts:  In: 100 (1 mL/kg) [IV Piggyback:100]  Out: - (0 mL/kg)   Weight: 104.3 kg     Physical Exam  Constitutional:       General: She is in acute distress.      Appearance: She is ill-appearing and toxic-appearing.   HENT:      Head: Normocephalic.      Nose: Congestion present.   Eyes:      Pupils: Pupils are equal, round, and reactive to light.   Cardiovascular:      Rate and Rhythm: Regular rhythm. Tachycardia present.      Pulses: Normal pulses.      Heart sounds: Normal heart sounds.   Pulmonary:      Effort: Respiratory distress present.      Breath sounds: Wheezing and rhonchi present.   Abdominal:      General: Bowel sounds are normal.      Palpations: Abdomen is soft.      Tenderness: There is abdominal tenderness.   Musculoskeletal:         General: Tenderness present. No swelling.   Skin:     General: Skin is warm.      Capillary Refill: Capillary refill takes less than 2 seconds.   Neurological:      General: No focal deficit present.      Mental Status: Mental status is at baseline.   Psychiatric:         Mood and Affect: Mood normal.         Scheduled Medications  [START ON 7/6/2024] azithromycin, 500 mg, intravenous, q24h  docusate sodium, 100 mg, oral, BID  enoxaparin, 40 mg, subcutaneous, q24h  melatonin, 3 mg, oral, Daily  meropenem, 1 g, intravenous, q8h  oxygen, , inhalation, Continuous - Inhalation  [START ON 7/6/2024] pantoprazole, 40 mg, oral, Daily before breakfast   Or  [START ON 7/6/2024] pantoprazole, 40 mg, intravenous, Daily before breakfast      PRN Medications  PRN medications: acetaminophen **OR** acetaminophen **OR** acetaminophen, acetaminophen **OR** acetaminophen **OR** acetaminophen, benzocaine-menthol, dextromethorphan-guaifenesin, ipratropium-albuteroL, ondansetron ODT **OR** ondansetron  Continuous Medications       Outpatient Medications:  Prior to Admission medications    Medication Sig Start Date  End Date Taking? Authorizing Provider   ipratropium (Atrovent) 21 mcg (0.03 %) nasal spray Administer 2 sprays into each nostril every 12 hours. 1/26/24 2/25/24  Kirill Ramirez MD   multivitamin tablet Take 1 tablet by mouth once daily. 3/25/22   Historical Provider, MD       LABS AND IMAGING:     Labs:  Results from last 7 days   Lab Units 07/05/24  1323   WBC AUTO x10*3/uL 21.9*   RBC AUTO x10*6/uL 4.16   HEMOGLOBIN g/dL 12.3   HEMATOCRIT % 36.9   MCV fL 89   MCH pg 29.6   MCHC g/dL 33.3   RDW % 14.1   PLATELETS AUTO x10*3/uL 364     Results from last 7 days   Lab Units 07/05/24  1323   SODIUM mmol/L 136   POTASSIUM mmol/L 3.8   CHLORIDE mmol/L 102   CO2 mmol/L 24   BUN mg/dL 6   CREATININE mg/dL 0.58   GLUCOSE mg/dL 109*   CALCIUM mg/dL 8.6     Results from last 7 days   Lab Units 07/05/24  1323   MAGNESIUM mg/dL 1.92     Results from last 7 days   Lab Units 07/05/24  1323   TROPHS ng/L 6       Imaging:  CT angio chest for pulmonary embolism  Addendum: Dr. Petty Damon is aware of the findings on 7/5/2024 at 4:08 PM   Signed by William Grimes MD  Narrative: STUDY:  CT Angiogram of the Chest; 7/5/2024 3:13 PM  INDICATION:  Shortness of breath.  COMPARISON:  11/5/2021, 8/2/2020 CTA chest.  ACCESSION NUMBER(S):  [id number]  ORDERING CLINICIAN:  PETTY DAMON  TECHNIQUE:  CTA of the chest was performed with intravenous contrast.   Images are reviewed and processed at a workstation according to the CT  angiogram protocol with 3-D and/or MIP post processing imaging  generated. mL was administered intravenously.  Automated mA/kV exposure control was utilized and patient examination  was performed in strict accordance with principles of ALARA.  FINDINGS:  Pulmonary arteries are adequately opacified without acute or chronic  filling defects.  The thoracic aorta is normal in course and caliber  without dissection or aneurysm.  The heart is normal in size without pericardial effusion.  Thoracic  lymph nodes are not  enlarged.  There is no pleural effusion, pleural thickening, or pneumothorax.   The airways are patent.  There is prominent consolidation in the right lower lobe. Within this  area consolidation, there is a rounded lesion with an air-fluid level.  This measures 2.3 x 2.6 cm.  Upper abdomen demonstrates no acute pathology.  There are no acute fractures.  No suspicious bony lesions.  Impression: 1. No pulmonary emboli.  2. There is prominent consolidation in the right lower lobe. Within  this area of consolidation, there is a rounded lesion with an  air-fluid level. This may represent a lung abscess.  Signed by William Grimes MD  XR chest 2 views  Narrative: STUDY:  Chest Radiographs;  7/5/24 at 1:30 PM  INDICATION:  Cough.  COMPARISON:  Chest XR 9/26/22.  ACCESSION NUMBER(S):  [id number]  ORDERING CLINICIAN:  JEANNINE DAMON  TECHNIQUE:  Frontal and lateral chest.   FINDINGS:  CARDIOMEDIASTINAL SILHOUETTE:  Cardiomediastinal silhouette is normal in size and configuration.     LUNGS:  Clear left hemithorax.  Moderate elevation of the right hemidiaphragm  with right basilar atelectasis.  Underlying right pleural effusion not  excluded.     ABDOMEN:  No remarkable upper abdominal findings.     BONES:  No acute osseous changes.  Impression: Moderate elevation of the right hemidiaphragm with right basilar  atelectasis. Underlying right pleural effusion not excluded.  Signed by Juan Jose Metzger MD  ECG 12 Lead  Sinus tachycardia  Cannot rule out Anterior infarct , age undetermined  T wave abnormality, consider inferior ischemia  Abnormal ECG  When compared with ECG of 06-SEP-2023 09:09,  Vent. rate has increased BY  41 BPM  Minimal criteria for Anterior infarct are now Present  T wave inversion now evident in Inferior leads  See ED provider note for full interpretation and clinical correlation

## 2024-07-06 NOTE — PROGRESS NOTES
Medical Group Progress Note  ASSESSMENT & PLAN:       # Pneumonia   # pulmonary nodules with known cyst vs abscess  - placed patient on meropenem and azithromycin due to abx allergies  -duonebs initiated  - pulmonology consulted.   - will consult ID   - continue with supportive care.   - continue home meds as allowed  - telemetry     VTE ppx: lovenox  Diet: cardiac   Code status:  full code       07/06  -  Patient seen and assessed during rounds.  Patient remains on supplemental oxygen.  -  Continuing on meropenem azithromycin for now  - added Mucomyst to patient's regimen.  Also ordered Acapella.  Have both scheduled and as needed DuoNebs  - will continue to monitor  On telemetry.  -  Continue home meds        Total time >35 minutes; > 50% spent counseling/coordinating care    -----This note was prepared using Dragon Medical voice recognition software. As a result, errors may occur. When identified, these errors have been corrected. While every attempt is made to correct errors during dictation, errors may still exist.------    Sahra Mc MD    SUBJECTIVE      patient seen and assessed.  Patient denies any worsening fevers chills nausea or vomiting.  Does endorse that overall feels better than yesterday but still having significant respiratory issues.    OBJECTIVE:     Last Recorded Vitals:  Vitals:    07/06/24 0817 07/06/24 0852 07/06/24 1355 07/06/24 1504   BP: 106/57   111/60   BP Location:       Patient Position:       Pulse: 78   81   Resp:       Temp: 36.3 °C (97.3 °F)   36.3 °C (97.3 °F)   TempSrc:    Temporal   SpO2: 95% 96% 97% 95%   Weight:       Height:         Last I/O:  I/O last 3 completed shifts:  In: 200 (1.9 mL/kg) [IV Piggyback:200]  Out: - (0 mL/kg)   Weight: 104.3 kg     Physical Exam    Constitutional:        In moderate distress  HENT:      Head: Normocephalic.      Nose: Congestion present.   Eyes:      Pupils: Pupils are equal, round, and reactive to light.   Cardiovascular:       Rate and Rhythm: Regular rhythm. Tachycardia present.      Pulses: Normal pulses.      Heart sounds: Normal heart sounds.   Pulmonary:      Effort: Respiratory distress present.      Breath sounds: Wheezing and rhonchi present.   Abdominal:      General: Bowel sounds are normal.      Palpations: Abdomen is soft.      Tenderness: There is abdominal tenderness.   Musculoskeletal:         General: Tenderness present. No swelling.   Neurological:      General: No focal deficit present.      Mental Status: Mental status is at baseline.       Inpatient Medications:  acetylcysteine, 3 mL, nebulization, 4x daily  azithromycin, 500 mg, intravenous, q24h  docusate sodium, 100 mg, oral, BID  enoxaparin, 40 mg, subcutaneous, q24h  ipratropium-albuteroL, 3 mL, nebulization, TID  melatonin, 3 mg, oral, Daily  meropenem, 1 g, intravenous, q8h  oxygen, , inhalation, Continuous - Inhalation  pantoprazole, 40 mg, oral, Daily before breakfast   Or  pantoprazole, 40 mg, intravenous, Daily before breakfast    PRN Medications  PRN medications: acetaminophen **OR** acetaminophen **OR** acetaminophen, acetaminophen **OR** acetaminophen **OR** acetaminophen, benzocaine-menthol, dextromethorphan-guaifenesin, ipratropium-albuteroL, ondansetron ODT **OR** ondansetron  Continuous Medications:     LABS AND IMAGING:     Labs:  Results from last 7 days   Lab Units 07/06/24  0513 07/05/24  1323   WBC AUTO x10*3/uL 17.9* 21.9*   RBC AUTO x10*6/uL 3.80* 4.16   HEMOGLOBIN g/dL 11.1* 12.3   HEMATOCRIT % 34.0* 36.9   MCV fL 90 89   MCH pg 29.2 29.6   MCHC g/dL 32.6 33.3   RDW % 14.0 14.1   PLATELETS AUTO x10*3/uL 378 364     Results from last 7 days   Lab Units 07/06/24  0513 07/05/24  1323   SODIUM mmol/L 138 136   POTASSIUM mmol/L 3.9 3.8   CHLORIDE mmol/L 105 102   CO2 mmol/L 26 24   BUN mg/dL 12 6   CREATININE mg/dL 0.57 0.58   GLUCOSE mg/dL 156* 109*   PROTEIN TOTAL g/dL 6.8  --    CALCIUM mg/dL 8.6 8.6   BILIRUBIN TOTAL mg/dL 0.3  --    ALK PHOS  U/L 80  --    AST U/L 8*  --    ALT U/L 21  --      Results from last 7 days   Lab Units 07/05/24  1323   MAGNESIUM mg/dL 1.92     Results from last 7 days   Lab Units 07/05/24  1323   TROPHS ng/L 6     Imaging:  CT angio chest for pulmonary embolism  Addendum: Dr. Jeannine Damon is aware of the findings on 7/5/2024 at 4:08 PM   Signed by William Grimes MD  Narrative: STUDY:  CT Angiogram of the Chest; 7/5/2024 3:13 PM  INDICATION:  Shortness of breath.  COMPARISON:  11/5/2021, 8/2/2020 CTA chest.  ACCESSION NUMBER(S):  [id number]  ORDERING CLINICIAN:  JEANNINE DAMON  TECHNIQUE:  CTA of the chest was performed with intravenous contrast.   Images are reviewed and processed at a workstation according to the CT  angiogram protocol with 3-D and/or MIP post processing imaging  generated. mL was administered intravenously.  Automated mA/kV exposure control was utilized and patient examination  was performed in strict accordance with principles of ALARA.  FINDINGS:  Pulmonary arteries are adequately opacified without acute or chronic  filling defects.  The thoracic aorta is normal in course and caliber  without dissection or aneurysm.  The heart is normal in size without pericardial effusion.  Thoracic  lymph nodes are not enlarged.  There is no pleural effusion, pleural thickening, or pneumothorax.   The airways are patent.  There is prominent consolidation in the right lower lobe. Within this  area consolidation, there is a rounded lesion with an air-fluid level.  This measures 2.3 x 2.6 cm.  Upper abdomen demonstrates no acute pathology.  There are no acute fractures.  No suspicious bony lesions.  Impression: 1. No pulmonary emboli.  2. There is prominent consolidation in the right lower lobe. Within  this area of consolidation, there is a rounded lesion with an  air-fluid level. This may represent a lung abscess.  Signed by William Grimes MD  XR chest 2 views  Narrative: STUDY:  Chest Radiographs;  7/5/24 at 1:30  PM  INDICATION:  Cough.  COMPARISON:  Chest XR 9/26/22.  ACCESSION NUMBER(S):  [id number]  ORDERING CLINICIAN:  JEANNINE DAMON  TECHNIQUE:  Frontal and lateral chest.   FINDINGS:  CARDIOMEDIASTINAL SILHOUETTE:  Cardiomediastinal silhouette is normal in size and configuration.     LUNGS:  Clear left hemithorax.  Moderate elevation of the right hemidiaphragm  with right basilar atelectasis.  Underlying right pleural effusion not  excluded.     ABDOMEN:  No remarkable upper abdominal findings.     BONES:  No acute osseous changes.  Impression: Moderate elevation of the right hemidiaphragm with right basilar  atelectasis. Underlying right pleural effusion not excluded.  Signed by Juan Jose Metzger MD  ECG 12 Lead  Sinus tachycardia  Cannot rule out Anterior infarct , age undetermined  T wave abnormality, consider inferior ischemia  Abnormal ECG  When compared with ECG of 06-SEP-2023 09:09,  Vent. rate has increased BY  41 BPM  Minimal criteria for Anterior infarct are now Present  T wave inversion now evident in Inferior leads  See ED provider note for full interpretation and clinical correlation

## 2024-07-07 VITALS
BODY MASS INDEX: 38.32 KG/M2 | DIASTOLIC BLOOD PRESSURE: 72 MMHG | HEART RATE: 99 BPM | TEMPERATURE: 100.4 F | HEIGHT: 65 IN | RESPIRATION RATE: 17 BRPM | SYSTOLIC BLOOD PRESSURE: 132 MMHG | OXYGEN SATURATION: 91 % | WEIGHT: 230 LBS

## 2024-07-07 LAB
ALBUMIN SERPL BCP-MCNC: 3 G/DL (ref 3.4–5)
ALP SERPL-CCNC: 75 U/L (ref 33–110)
ALT SERPL W P-5'-P-CCNC: 37 U/L (ref 7–45)
ANION GAP SERPL CALC-SCNC: 12 MMOL/L (ref 10–20)
AST SERPL W P-5'-P-CCNC: 22 U/L (ref 9–39)
BILIRUB SERPL-MCNC: 0.2 MG/DL (ref 0–1.2)
BUN SERPL-MCNC: 14 MG/DL (ref 6–23)
CALCIUM SERPL-MCNC: 8.4 MG/DL (ref 8.6–10.3)
CHLORIDE SERPL-SCNC: 106 MMOL/L (ref 98–107)
CO2 SERPL-SCNC: 27 MMOL/L (ref 21–32)
CREAT SERPL-MCNC: 0.59 MG/DL (ref 0.5–1.05)
EGFRCR SERPLBLD CKD-EPI 2021: >90 ML/MIN/1.73M*2
ERYTHROCYTE [DISTWIDTH] IN BLOOD BY AUTOMATED COUNT: 14.2 % (ref 11.5–14.5)
GLUCOSE SERPL-MCNC: 106 MG/DL (ref 74–99)
HCT VFR BLD AUTO: 33.9 % (ref 36–46)
HGB BLD-MCNC: 10.9 G/DL (ref 12–16)
HOLD SPECIMEN: NORMAL
MCH RBC QN AUTO: 29.2 PG (ref 26–34)
MCHC RBC AUTO-ENTMCNC: 32.2 G/DL (ref 32–36)
MCV RBC AUTO: 91 FL (ref 80–100)
NRBC BLD-RTO: 0 /100 WBCS (ref 0–0)
PLATELET # BLD AUTO: 395 X10*3/UL (ref 150–450)
POTASSIUM SERPL-SCNC: 3.8 MMOL/L (ref 3.5–5.3)
PROT SERPL-MCNC: 6.5 G/DL (ref 6.4–8.2)
RBC # BLD AUTO: 3.73 X10*6/UL (ref 4–5.2)
SODIUM SERPL-SCNC: 141 MMOL/L (ref 136–145)
WBC # BLD AUTO: 18 X10*3/UL (ref 4.4–11.3)

## 2024-07-07 PROCEDURE — 82784 ASSAY IGA/IGD/IGG/IGM EACH: CPT | Mod: ELYLAB | Performed by: STUDENT IN AN ORGANIZED HEALTH CARE EDUCATION/TRAINING PROGRAM

## 2024-07-07 PROCEDURE — 80053 COMPREHEN METABOLIC PANEL: CPT | Performed by: STUDENT IN AN ORGANIZED HEALTH CARE EDUCATION/TRAINING PROGRAM

## 2024-07-07 PROCEDURE — 99232 SBSQ HOSP IP/OBS MODERATE 35: CPT | Performed by: STUDENT IN AN ORGANIZED HEALTH CARE EDUCATION/TRAINING PROGRAM

## 2024-07-07 PROCEDURE — 2500000002 HC RX 250 W HCPCS SELF ADMINISTERED DRUGS (ALT 637 FOR MEDICARE OP, ALT 636 FOR OP/ED): Performed by: STUDENT IN AN ORGANIZED HEALTH CARE EDUCATION/TRAINING PROGRAM

## 2024-07-07 PROCEDURE — 36415 COLL VENOUS BLD VENIPUNCTURE: CPT | Performed by: STUDENT IN AN ORGANIZED HEALTH CARE EDUCATION/TRAINING PROGRAM

## 2024-07-07 PROCEDURE — 94640 AIRWAY INHALATION TREATMENT: CPT

## 2024-07-07 PROCEDURE — 1200000002 HC GENERAL ROOM WITH TELEMETRY DAILY

## 2024-07-07 PROCEDURE — 85027 COMPLETE CBC AUTOMATED: CPT | Performed by: STUDENT IN AN ORGANIZED HEALTH CARE EDUCATION/TRAINING PROGRAM

## 2024-07-07 PROCEDURE — 2500000004 HC RX 250 GENERAL PHARMACY W/ HCPCS (ALT 636 FOR OP/ED): Performed by: STUDENT IN AN ORGANIZED HEALTH CARE EDUCATION/TRAINING PROGRAM

## 2024-07-07 PROCEDURE — 2500000001 HC RX 250 WO HCPCS SELF ADMINISTERED DRUGS (ALT 637 FOR MEDICARE OP): Performed by: STUDENT IN AN ORGANIZED HEALTH CARE EDUCATION/TRAINING PROGRAM

## 2024-07-07 ASSESSMENT — COGNITIVE AND FUNCTIONAL STATUS - GENERAL
MOBILITY SCORE: 24
MOBILITY SCORE: 24
DAILY ACTIVITIY SCORE: 24
DAILY ACTIVITIY SCORE: 24

## 2024-07-07 ASSESSMENT — PAIN SCALES - GENERAL
PAINLEVEL_OUTOF10: 0 - NO PAIN
PAINLEVEL_OUTOF10: 0 - NO PAIN

## 2024-07-07 NOTE — CARE PLAN
Problem: Pain - Adult  Goal: Verbalizes/displays adequate comfort level or baseline comfort level  Outcome: Progressing     Problem: Safety - Adult  Goal: Free from fall injury  Outcome: Progressing     Problem: Discharge Planning  Goal: Discharge to home or other facility with appropriate resources  Outcome: Progressing     Problem: Chronic Conditions and Co-morbidities  Goal: Patient's chronic conditions and co-morbidity symptoms are monitored and maintained or improved  Outcome: Progressing     Problem: Pain  Goal: Takes deep breaths with improved pain control throughout the shift  Outcome: Progressing  Goal: Turns in bed with improved pain control throughout the shift  Outcome: Progressing  Goal: Walks with improved pain control throughout the shift  Outcome: Progressing  Goal: Performs ADL's with improved pain control throughout shift  Outcome: Progressing  Goal: Participates in PT with improved pain control throughout the shift  Outcome: Progressing  Goal: Free from opioid side effects throughout the shift  Outcome: Progressing  Goal: Free from acute confusion related to pain meds throughout the shift  Outcome: Progressing   The patient's goals for the shift include to rest    The clinical goals for the shift include Maintain SpO2>94%

## 2024-07-07 NOTE — PROGRESS NOTES
Medical Group Progress Note  ASSESSMENT & PLAN:     # Pneumonia   # pulmonary nodules with known cyst vs abscess  - placed patient on meropenem and azithromycin due to abx allergies  -duonebs initiated  - pulmonology consulted.   - will consult ID   - continue with supportive care.   - continue home meds as allowed  - telemetry     VTE ppx: lovenox  Diet: cardiac   Code status:  full code        07/06  -  Patient seen and assessed during rounds.  Patient remains on supplemental oxygen.  -  Continuing on meropenem azithromycin for now  - added Mucomyst to patient's regimen.  Also ordered Acapella.  Have both scheduled and as needed DuoNebs  - will continue to monitor  On telemetry.  -  Continue home meds    07/07  -  Patient has significantly improved today and is currently off oxygen.  -  Continuing with meropenem and azithromycin this point in time.  Will continue with Mucomyst as well as DuoNebs  .-  Continue with incentive spirometer and Acapella  -Labs to show some resolving leukocytosis.  Patient  has had multiple infections in the recent past.  Will check  immunoglobulin levels.  -   Continue home meds as allowed.  Will discuss patient with infectious disease for appropriate possible oral regimen if patient stable enough to be discharged home tomorrow      Total time >35 minutes; > 50% spent counseling/coordinating care    -----This note was prepared using Dragon Medical voice recognition software. As a result, errors may occur. When identified, these errors have been corrected. While every attempt is made to correct errors during dictation, errors may still exist.------    Sahra Mc MD    SUBJECTIVE     patient seen and assessed during rounds.  Patient reporting improvement in breathing.  Has been an able to be weaned off of supplemental oxygen.   reports of possible fever overnight but nothing seen on vitals when reviewed.    OBJECTIVE:     Last Recorded Vitals:  Vitals:    07/07/24 0954 07/07/24  1141 07/07/24 1337 07/07/24 1455   BP:    131/70   BP Location:       Patient Position:       Pulse:    85   Resp: 16      Temp: 36.5 °C (97.7 °F)   36.9 °C (98.4 °F)   TempSrc:       SpO2:  92% 94% 93%   Weight:       Height:         Last I/O:  I/O last 3 completed shifts:  In: 595.8 (5.7 mL/kg) [I.V.:45.8 (0.4 mL/kg); IV Piggyback:550]  Out: - (0 mL/kg)   Weight: 104.3 kg     Physical Exam    Constitutional:        In   No acute distress  HENT:      Head: Normocephalic.      Nose: Congestion  resolved  Eyes:      Pupils: Pupils are equal, round, and reactive to light.   Cardiovascular:      Rate and Rhythm: Regular rhythm. Tachycardia present.      Pulses: Normal pulses.      Heart sounds: Normal heart sounds.   Pulmonary:      Effort: Respiratory distress  resolved.      Breath sounds: Wheezing and rhonchi present but significantly decreased.   Abdominal:      General: Bowel sounds are normal.      Palpations: Abdomen is soft.      Tenderness: There is abdominal tenderness.   Musculoskeletal:         General: Tenderness present. No swelling.   Neurological:      General: No focal deficit present.      Mental Status: Mental status is at baseline.    Inpatient Medications:  acetylcysteine, 3 mL, nebulization, 4x daily  docusate sodium, 100 mg, oral, BID  enoxaparin, 40 mg, subcutaneous, q24h  ipratropium-albuteroL, 3 mL, nebulization, TID  melatonin, 3 mg, oral, Daily  meropenem, 1 g, intravenous, q8h  oxygen, , inhalation, Continuous - Inhalation  pantoprazole, 40 mg, oral, Daily before breakfast   Or  pantoprazole, 40 mg, intravenous, Daily before breakfast    PRN Medications  PRN medications: acetaminophen **OR** acetaminophen **OR** acetaminophen, acetaminophen **OR** acetaminophen **OR** acetaminophen, benzocaine-menthol, dextromethorphan-guaifenesin, ipratropium-albuteroL, ondansetron ODT **OR** ondansetron  Continuous Medications:     LABS AND IMAGING:     Labs:  Results from last 7 days   Lab Units  07/07/24  0514 07/06/24  0513 07/05/24  1323   WBC AUTO x10*3/uL 18.0* 17.9* 21.9*   RBC AUTO x10*6/uL 3.73* 3.80* 4.16   HEMOGLOBIN g/dL 10.9* 11.1* 12.3   HEMATOCRIT % 33.9* 34.0* 36.9   MCV fL 91 90 89   MCH pg 29.2 29.2 29.6   MCHC g/dL 32.2 32.6 33.3   RDW % 14.2 14.0 14.1   PLATELETS AUTO x10*3/uL 395 378 364     Results from last 7 days   Lab Units 07/07/24  0514 07/06/24  0513 07/05/24  1323   SODIUM mmol/L 141 138 136   POTASSIUM mmol/L 3.8 3.9 3.8   CHLORIDE mmol/L 106 105 102   CO2 mmol/L 27 26 24   BUN mg/dL 14 12 6   CREATININE mg/dL 0.59 0.57 0.58   GLUCOSE mg/dL 106* 156* 109*   PROTEIN TOTAL g/dL 6.5 6.8  --    CALCIUM mg/dL 8.4* 8.6 8.6   BILIRUBIN TOTAL mg/dL 0.2 0.3  --    ALK PHOS U/L 75 80  --    AST U/L 22 8*  --    ALT U/L 37 21  --      Results from last 7 days   Lab Units 07/05/24  1323   MAGNESIUM mg/dL 1.92     Results from last 7 days   Lab Units 07/05/24  1323   TROPHS ng/L 6     Imaging:  CT angio chest for pulmonary embolism  Addendum: Dr. Jeannine Damon is aware of the findings on 7/5/2024 at 4:08 PM   Signed by William Grimes MD  Narrative: STUDY:  CT Angiogram of the Chest; 7/5/2024 3:13 PM  INDICATION:  Shortness of breath.  COMPARISON:  11/5/2021, 8/2/2020 CTA chest.  ACCESSION NUMBER(S):  [id number]  ORDERING CLINICIAN:  JEANNINE DAMON  TECHNIQUE:  CTA of the chest was performed with intravenous contrast.   Images are reviewed and processed at a workstation according to the CT  angiogram protocol with 3-D and/or MIP post processing imaging  generated. mL was administered intravenously.  Automated mA/kV exposure control was utilized and patient examination  was performed in strict accordance with principles of ALARA.  FINDINGS:  Pulmonary arteries are adequately opacified without acute or chronic  filling defects.  The thoracic aorta is normal in course and caliber  without dissection or aneurysm.  The heart is normal in size without pericardial effusion.  Thoracic  lymph  nodes are not enlarged.  There is no pleural effusion, pleural thickening, or pneumothorax.   The airways are patent.  There is prominent consolidation in the right lower lobe. Within this  area consolidation, there is a rounded lesion with an air-fluid level.  This measures 2.3 x 2.6 cm.  Upper abdomen demonstrates no acute pathology.  There are no acute fractures.  No suspicious bony lesions.  Impression: 1. No pulmonary emboli.  2. There is prominent consolidation in the right lower lobe. Within  this area of consolidation, there is a rounded lesion with an  air-fluid level. This may represent a lung abscess.  Signed by William Grimes MD  XR chest 2 views  Narrative: STUDY:  Chest Radiographs;  7/5/24 at 1:30 PM  INDICATION:  Cough.  COMPARISON:  Chest XR 9/26/22.  ACCESSION NUMBER(S):  [id number]  ORDERING CLINICIAN:  JEANNINE DAMON  TECHNIQUE:  Frontal and lateral chest.   FINDINGS:  CARDIOMEDIASTINAL SILHOUETTE:  Cardiomediastinal silhouette is normal in size and configuration.     LUNGS:  Clear left hemithorax.  Moderate elevation of the right hemidiaphragm  with right basilar atelectasis.  Underlying right pleural effusion not  excluded.     ABDOMEN:  No remarkable upper abdominal findings.     BONES:  No acute osseous changes.  Impression: Moderate elevation of the right hemidiaphragm with right basilar  atelectasis. Underlying right pleural effusion not excluded.  Signed by Juan Jose Metzger MD  ECG 12 Lead  Sinus tachycardia  Cannot rule out Anterior infarct , age undetermined  T wave abnormality, consider inferior ischemia  Abnormal ECG  When compared with ECG of 06-SEP-2023 09:09,  Vent. rate has increased BY  41 BPM  Minimal criteria for Anterior infarct are now Present  T wave inversion now evident in Inferior leads  See ED provider note for full interpretation and clinical correlation        Biopsy Method: Dermablade

## 2024-07-07 NOTE — CARE PLAN
The patient's goals for the shift include to rest    The clinical goals for the shift include Maintain SpO2>94%    Problem: Pain - Adult  Goal: Verbalizes/displays adequate comfort level or baseline comfort level  Outcome: Progressing     Problem: Safety - Adult  Goal: Free from fall injury  Outcome: Progressing     Problem: Discharge Planning  Goal: Discharge to home or other facility with appropriate resources  Outcome: Progressing     Problem: Chronic Conditions and Co-morbidities  Goal: Patient's chronic conditions and co-morbidity symptoms are monitored and maintained or improved  Outcome: Progressing     Problem: Pain  Goal: Takes deep breaths with improved pain control throughout the shift  Outcome: Progressing  Goal: Turns in bed with improved pain control throughout the shift  Outcome: Progressing  Goal: Walks with improved pain control throughout the shift  Outcome: Progressing  Goal: Performs ADL's with improved pain control throughout shift  Outcome: Progressing  Goal: Participates in PT with improved pain control throughout the shift  Outcome: Progressing  Goal: Free from opioid side effects throughout the shift  Outcome: Progressing  Goal: Free from acute confusion related to pain meds throughout the shift  Outcome: Progressing

## 2024-07-08 LAB
ALBUMIN SERPL BCP-MCNC: 3 G/DL (ref 3.4–5)
ALP SERPL-CCNC: 73 U/L (ref 33–110)
ALT SERPL W P-5'-P-CCNC: 37 U/L (ref 7–45)
ANION GAP SERPL CALC-SCNC: 12 MMOL/L (ref 10–20)
AST SERPL W P-5'-P-CCNC: 18 U/L (ref 9–39)
BILIRUB SERPL-MCNC: 0.4 MG/DL (ref 0–1.2)
BUN SERPL-MCNC: 10 MG/DL (ref 6–23)
CALCIUM SERPL-MCNC: 8.4 MG/DL (ref 8.6–10.3)
CHLORIDE SERPL-SCNC: 105 MMOL/L (ref 98–107)
CO2 SERPL-SCNC: 24 MMOL/L (ref 21–32)
CREAT SERPL-MCNC: 0.59 MG/DL (ref 0.5–1.05)
EGFRCR SERPLBLD CKD-EPI 2021: >90 ML/MIN/1.73M*2
ERYTHROCYTE [DISTWIDTH] IN BLOOD BY AUTOMATED COUNT: 14.4 % (ref 11.5–14.5)
GLUCOSE SERPL-MCNC: 100 MG/DL (ref 74–99)
HCT VFR BLD AUTO: 34.7 % (ref 36–46)
HGB BLD-MCNC: 11.3 G/DL (ref 12–16)
HOLD SPECIMEN: NORMAL
IGA SERPL-MCNC: 345 MG/DL (ref 70–400)
IGG SERPL-MCNC: 844 MG/DL (ref 700–1600)
IGM SERPL-MCNC: 84 MG/DL (ref 40–230)
MCH RBC QN AUTO: 29.4 PG (ref 26–34)
MCHC RBC AUTO-ENTMCNC: 32.6 G/DL (ref 32–36)
MCV RBC AUTO: 90 FL (ref 80–100)
NRBC BLD-RTO: 0 /100 WBCS (ref 0–0)
PLATELET # BLD AUTO: 422 X10*3/UL (ref 150–450)
POTASSIUM SERPL-SCNC: 3.9 MMOL/L (ref 3.5–5.3)
PROT SERPL-MCNC: 6.3 G/DL (ref 6.4–8.2)
RBC # BLD AUTO: 3.84 X10*6/UL (ref 4–5.2)
SODIUM SERPL-SCNC: 137 MMOL/L (ref 136–145)
WBC # BLD AUTO: 15.2 X10*3/UL (ref 4.4–11.3)

## 2024-07-08 PROCEDURE — 2500000005 HC RX 250 GENERAL PHARMACY W/O HCPCS: Performed by: EMERGENCY MEDICINE

## 2024-07-08 PROCEDURE — 85027 COMPLETE CBC AUTOMATED: CPT | Performed by: STUDENT IN AN ORGANIZED HEALTH CARE EDUCATION/TRAINING PROGRAM

## 2024-07-08 PROCEDURE — 2500000002 HC RX 250 W HCPCS SELF ADMINISTERED DRUGS (ALT 637 FOR MEDICARE OP, ALT 636 FOR OP/ED): Performed by: STUDENT IN AN ORGANIZED HEALTH CARE EDUCATION/TRAINING PROGRAM

## 2024-07-08 PROCEDURE — 2500000004 HC RX 250 GENERAL PHARMACY W/ HCPCS (ALT 636 FOR OP/ED): Performed by: STUDENT IN AN ORGANIZED HEALTH CARE EDUCATION/TRAINING PROGRAM

## 2024-07-08 PROCEDURE — 99232 SBSQ HOSP IP/OBS MODERATE 35: CPT | Performed by: STUDENT IN AN ORGANIZED HEALTH CARE EDUCATION/TRAINING PROGRAM

## 2024-07-08 PROCEDURE — 2500000001 HC RX 250 WO HCPCS SELF ADMINISTERED DRUGS (ALT 637 FOR MEDICARE OP): Performed by: STUDENT IN AN ORGANIZED HEALTH CARE EDUCATION/TRAINING PROGRAM

## 2024-07-08 PROCEDURE — 94640 AIRWAY INHALATION TREATMENT: CPT

## 2024-07-08 PROCEDURE — 36415 COLL VENOUS BLD VENIPUNCTURE: CPT | Performed by: STUDENT IN AN ORGANIZED HEALTH CARE EDUCATION/TRAINING PROGRAM

## 2024-07-08 PROCEDURE — 80053 COMPREHEN METABOLIC PANEL: CPT | Performed by: STUDENT IN AN ORGANIZED HEALTH CARE EDUCATION/TRAINING PROGRAM

## 2024-07-08 PROCEDURE — 1200000002 HC GENERAL ROOM WITH TELEMETRY DAILY

## 2024-07-08 ASSESSMENT — PAIN SCALES - WONG BAKER: WONGBAKER_NUMERICALRESPONSE: NO HURT

## 2024-07-08 ASSESSMENT — COGNITIVE AND FUNCTIONAL STATUS - GENERAL
MOBILITY SCORE: 24
DAILY ACTIVITIY SCORE: 24

## 2024-07-08 ASSESSMENT — PAIN SCALES - GENERAL: PAINLEVEL_OUTOF10: 0 - NO PAIN

## 2024-07-08 NOTE — PROGRESS NOTES
Medical Group Progress Note  ASSESSMENT & PLAN:       # Pneumonia   # pulmonary nodules with known cyst vs abscess  - placed patient on meropenem and azithromycin due to abx allergies  -duonebs initiated  - pulmonology consulted.   - will consult ID   - continue with supportive care.   - continue home meds as allowed  - telemetry     VTE ppx: lovenox  Diet: cardiac   Code status:  full code        07/06  -  Patient seen and assessed during rounds.  Patient remains on supplemental oxygen.  -  Continuing on meropenem azithromycin for now  - added Mucomyst to patient's regimen.  Also ordered Acapella.  Have both scheduled and as needed DuoNebs  - will continue to monitor  On telemetry.  -  Continue home meds     07/07  -  Patient has significantly improved today and is currently off oxygen.  -  Continuing with meropenem and azithromycin this point in time.  Will continue with Mucomyst as well as DuoNebs  .-  Continue with incentive spirometer and Acapella  -Labs to show some resolving leukocytosis.  Patient  has had multiple infections in the recent past.  Will check  immunoglobulin levels.  -   Continue home meds as allowed.  Will discuss patient with infectious disease for appropriate possible oral regimen if patient stable enough to be discharged home tomorrow    07/08  - patient continues to improve.  -   Seen and assessed by pulmonology who are recommending that patient have home O2 evaluation on discharge.  Will need to follow-up in terms of lung nodule given that it is not it was mostly due to mucus impaction.  -  White count decreasing.  Will continue to monitor  - will await pulmonology clearance before discharge.  -  Continue with current breathing treatment regimen.        Total time >35 minutes; > 50% spent counseling/coordinating care    -----This note was prepared using Dragon Medical voice recognition software. As a result, errors may occur. When identified, these errors have been corrected. While  every attempt is made to correct errors during dictation, errors may still exist.------    Sahra Mc MD    SUBJECTIVE       Patient seen and assessed during rounds.  Patient in no acute distress.  Denies any fevers chills nausea or vomiting at this point in time.  Reports that shortness of breath is improving.  Denies any worsening cough or chest pain.    OBJECTIVE:     Last Recorded Vitals:  Vitals:    07/07/24 2024 07/08/24 0717 07/08/24 0915 07/08/24 1503   BP: 132/72  129/57 129/69   BP Location: Left arm   Left arm   Patient Position:    Sitting   Pulse: 99  101 94   Resp: 17      Temp: (!) 38 °C (100.4 °F)  36.3 °C (97.3 °F) 37.1 °C (98.8 °F)   TempSrc: Temporal   Temporal   SpO2: 91% 90% 91% 92%   Weight:       Height:         Last I/O:  I/O last 3 completed shifts:  In: 600 (5.8 mL/kg) [IV Piggyback:600]  Out: - (0 mL/kg)   Weight: 104.3 kg     Physical Exam    Constitutional:    No acute distress  HENT:      Head: Normocephalic.      Nose: Congestion  resolved  Eyes:      Pupils: Pupils are equal, round, and reactive to light.   Cardiovascular:      Rate and Rhythm: Regular rhythm. Tachycardia present.      Pulses: Normal pulses.      Heart sounds: Normal heart sounds.   Pulmonary:      Effort: Respiratory distress  resolved.      Breath sounds: Wheezing and rhonchi present but significantly decreased.   Abdominal:      General: Bowel sounds are normal.      Palpations: Abdomen is soft.      Tenderness: There is abdominal tenderness.   Musculoskeletal:         General: Tenderness present. No swelling.   Neurological:      General: No focal deficit present.      Mental Status: Mental status is at baseline.    Inpatient Medications:  acetylcysteine, 3 mL, nebulization, 4x daily  docusate sodium, 100 mg, oral, BID  enoxaparin, 40 mg, subcutaneous, q24h  ipratropium-albuteroL, 3 mL, nebulization, TID  melatonin, 3 mg, oral, Daily  meropenem, 1 g, intravenous, q8h  oxygen, , inhalation, Continuous -  Inhalation  pantoprazole, 40 mg, oral, Daily before breakfast   Or  pantoprazole, 40 mg, intravenous, Daily before breakfast    PRN Medications  PRN medications: acetaminophen **OR** acetaminophen **OR** acetaminophen, acetaminophen **OR** acetaminophen **OR** acetaminophen, benzocaine-menthol, dextromethorphan-guaifenesin, ipratropium-albuteroL, ondansetron ODT **OR** ondansetron  Continuous Medications:     LABS AND IMAGING:     Labs:  Results from last 7 days   Lab Units 07/08/24  0430 07/07/24  0514 07/06/24  0513   WBC AUTO x10*3/uL 15.2* 18.0* 17.9*   RBC AUTO x10*6/uL 3.84* 3.73* 3.80*   HEMOGLOBIN g/dL 11.3* 10.9* 11.1*   HEMATOCRIT % 34.7* 33.9* 34.0*   MCV fL 90 91 90   MCH pg 29.4 29.2 29.2   MCHC g/dL 32.6 32.2 32.6   RDW % 14.4 14.2 14.0   PLATELETS AUTO x10*3/uL 422 395 378     Results from last 7 days   Lab Units 07/08/24  0429 07/07/24  0514 07/06/24  0513   SODIUM mmol/L 137 141 138   POTASSIUM mmol/L 3.9 3.8 3.9   CHLORIDE mmol/L 105 106 105   CO2 mmol/L 24 27 26   BUN mg/dL 10 14 12   CREATININE mg/dL 0.59 0.59 0.57   GLUCOSE mg/dL 100* 106* 156*   PROTEIN TOTAL g/dL 6.3* 6.5 6.8   CALCIUM mg/dL 8.4* 8.4* 8.6   BILIRUBIN TOTAL mg/dL 0.4 0.2 0.3   ALK PHOS U/L 73 75 80   AST U/L 18 22 8*   ALT U/L 37 37 21     Results from last 7 days   Lab Units 07/05/24  1323   MAGNESIUM mg/dL 1.92     Results from last 7 days   Lab Units 07/05/24  1323   TROPHS ng/L 6     Imaging:  CT angio chest for pulmonary embolism  Addendum: Dr. Jeannine Damon is aware of the findings on 7/5/2024 at 4:08 PM   Signed by William Grimes MD  Narrative: STUDY:  CT Angiogram of the Chest; 7/5/2024 3:13 PM  INDICATION:  Shortness of breath.  COMPARISON:  11/5/2021, 8/2/2020 CTA chest.  ACCESSION NUMBER(S):  [id number]  ORDERING CLINICIAN:  JEANNINE DAMON  TECHNIQUE:  CTA of the chest was performed with intravenous contrast.   Images are reviewed and processed at a workstation according to the CT  angiogram protocol with 3-D  and/or MIP post processing imaging  generated. mL was administered intravenously.  Automated mA/kV exposure control was utilized and patient examination  was performed in strict accordance with principles of ALARA.  FINDINGS:  Pulmonary arteries are adequately opacified without acute or chronic  filling defects.  The thoracic aorta is normal in course and caliber  without dissection or aneurysm.  The heart is normal in size without pericardial effusion.  Thoracic  lymph nodes are not enlarged.  There is no pleural effusion, pleural thickening, or pneumothorax.   The airways are patent.  There is prominent consolidation in the right lower lobe. Within this  area consolidation, there is a rounded lesion with an air-fluid level.  This measures 2.3 x 2.6 cm.  Upper abdomen demonstrates no acute pathology.  There are no acute fractures.  No suspicious bony lesions.  Impression: 1. No pulmonary emboli.  2. There is prominent consolidation in the right lower lobe. Within  this area of consolidation, there is a rounded lesion with an  air-fluid level. This may represent a lung abscess.  Signed by William Grimes MD  XR chest 2 views  Narrative: STUDY:  Chest Radiographs;  7/5/24 at 1:30 PM  INDICATION:  Cough.  COMPARISON:  Chest XR 9/26/22.  ACCESSION NUMBER(S):  [id number]  ORDERING CLINICIAN:  JEANNINE DAMON  TECHNIQUE:  Frontal and lateral chest.   FINDINGS:  CARDIOMEDIASTINAL SILHOUETTE:  Cardiomediastinal silhouette is normal in size and configuration.     LUNGS:  Clear left hemithorax.  Moderate elevation of the right hemidiaphragm  with right basilar atelectasis.  Underlying right pleural effusion not  excluded.     ABDOMEN:  No remarkable upper abdominal findings.     BONES:  No acute osseous changes.  Impression: Moderate elevation of the right hemidiaphragm with right basilar  atelectasis. Underlying right pleural effusion not excluded.  Signed by Juan Jose Metgzer MD  ECG 12 Lead  Sinus tachycardia  Cannot rule  out Anterior infarct , age undetermined  T wave abnormality, consider inferior ischemia  Abnormal ECG  When compared with ECG of 06-SEP-2023 09:09,  Vent. rate has increased BY  41 BPM  Minimal criteria for Anterior infarct are now Present  T wave inversion now evident in Inferior leads  See ED provider note for full interpretation and clinical correlation

## 2024-07-08 NOTE — CARE PLAN
Problem: Pain - Adult  Goal: Verbalizes/displays adequate comfort level or baseline comfort level  Outcome: Progressing     Problem: Safety - Adult  Goal: Free from fall injury  Outcome: Progressing     Problem: Discharge Planning  Goal: Discharge to home or other facility with appropriate resources  Outcome: Progressing     Problem: Chronic Conditions and Co-morbidities  Goal: Patient's chronic conditions and co-morbidity symptoms are monitored and maintained or improved  Outcome: Progressing     Problem: Pain  Goal: Takes deep breaths with improved pain control throughout the shift  Outcome: Progressing  Goal: Turns in bed with improved pain control throughout the shift  Outcome: Progressing  Goal: Walks with improved pain control throughout the shift  Outcome: Progressing  Goal: Performs ADL's with improved pain control throughout shift  Outcome: Progressing  Goal: Participates in PT with improved pain control throughout the shift  Outcome: Progressing  Goal: Free from opioid side effects throughout the shift  Outcome: Progressing  Goal: Free from acute confusion related to pain meds throughout the shift  Outcome: Progressing   The patient's goals for the shift include to rest    The clinical goals for the shift include maintain SpO2>90%

## 2024-07-08 NOTE — CONSULTS
Reason For Consult  Pneumonia, abnormal CT scan of the chest, lung abscess, reactive airway disease versus COPD.    Chief Complaint: Pneumonia      History Of Present Illness:    Nelda Hewitt is a 53 y.o. female with a significant past medical history of era infections, pulmonary nodules,  and hyperglycemia who states that she had bilateral ear infections about 2 weeks ago. She was on Ceftin. She states that now she feels like it is in her chest. She states that she is feels ill all over. She states that she has a productive cough. She denies any chest pain. She states that she is aching all over. She states that she has multiple antibiotic allergies which is why they gave her cefdinir. She states that she is specifically allergic to doxycycline Levaquin and penicillins and vancomycin. She denies any swelling in her legs. She states that she does get short of breath with exertion. She states that her ears actually feel better.      In the ER  patient had a temp of 38.1C and .  Labs significant for WBC 21.9.  CT chest showed prominent consolidation in the right lower lobe with a rounded lesion with an air-fluid level concerning for lung abscess.      I was asked to evaluate and follow from a pulmonary perspective.  Patient is a pleasant 53-year-old female with history of recurrent ear infections lung nodule and dyslipidemia who had bilateral ear infections 2 weeks ago treated with Ceftin.  Subsequently she felt ill few days ago and had a productive cough with generalized bodyaches.  Patient put on SciFit not in view of multiple drug allergies.  She felt quite ill hence came to the ER where she had a temp of 38.1 heart rate 119, elevated WBC count 21.9 CT chest showed prominent right lower lobe consolidation with evidence of lung abscess as well.  In view of multiple drug allergies patient is currently in IV Zithromax and IV meropenem.  Since admission she is doing much better and has been weaned off oxygen.   On admission she was apparently up to 4 L oxygen.  She has been previously suspected to have COPD.  She has had bilateral ear infections since early childhood and previously had bilateral tympanostomy tubes.     Review of systems: 10 point review of systems is otherwise negative except as mentioned above.     Past Medical History:  She has a past medical history of Allergic, Atherosclerotic heart disease of native coronary artery without angina pectoris, and Personal history of other diseases of the respiratory system (05/18/2019).     Past Surgical History:  She has a past surgical history that includes Other surgical history (05/18/2019); Other surgical history (11/04/2019); Hysterectomy; Tubal ligation; Cholecystectomy; and Appendectomy.      Social History:  She reports that she quit smoking about 10 years ago. Her smoking use included cigarettes. She started smoking about 40 years ago. She has a 30 pack-year smoking history. She has never used smokeless tobacco. She reports that she does not currently use alcohol after a past usage of about 2.0 standard drinks of alcohol per week. She reports that she does not use drugs.     Family History:  Family History          Family History   Problem Relation Name Age of Onset    COPD Father Tc Sanchezlip      Emphysema Father Tc Sanchezlip      Breast cancer Maternal Grandmother Daria Cherry      Diabetes Maternal Grandmother Daria Cherry      Heart attack Maternal Grandfather                Allergies:  Doxycycline, Levofloxacin, Penicillins, and Vancomycin     Last Recorded Vitals:  Vitals          Vitals:     07/05/24 1155 07/05/24 1447 07/05/24 1656 07/05/24 2041   BP:   105/68 106/59 121/58   BP Location:     Left arm Left arm   Patient Position:     Lying Lying   Pulse:   (!) 106 105 88   Resp:   18 18 16   Temp:     36.2 °C (97.2 °F) 36.1 °C (97 °F)   TempSrc:     Temporal Temporal   SpO2: (!) 93% 96% 95% 93%   Weight:           Height:                 Last  I/O:  I/O last 3 completed shifts:  In: 100 (1 mL/kg) [IV Piggyback:100]  Out: - (0 mL/kg)   Weight: 104.3 kg      Physical Exam  Constitutional:       General: She is in acute distress.      Appearance: She is ill-appearing and toxic-appearing.   HENT:      Head: Normocephalic.      Nose: Congestion present.   Eyes:      Pupils: Pupils are equal, round, and reactive to light.   Cardiovascular:      Rate and Rhythm: Regular rhythm. Tachycardia present.      Pulses: Normal pulses.      Heart sounds: Normal heart sounds.   Pulmonary:      Effort: Respiratory distress present.      Breath sounds: Wheezing and rhonchi present.   Abdominal:      General: Bowel sounds are normal.      Palpations: Abdomen is soft.      Tenderness: There is abdominal tenderness.   Musculoskeletal:         General: Tenderness present. No swelling.   Skin:     General: Skin is warm.      Capillary Refill: Capillary refill takes less than 2 seconds.   Neurological:      General: No focal deficit present.      Mental Status: Mental status is at baseline.   Psychiatric:         Mood and Affect: Mood normal.            Scheduled Medications  [START ON 7/6/2024] azithromycin, 500 mg, intravenous, q24h  docusate sodium, 100 mg, oral, BID  enoxaparin, 40 mg, subcutaneous, q24h  melatonin, 3 mg, oral, Daily  meropenem, 1 g, intravenous, q8h  oxygen, , inhalation, Continuous - Inhalation  [START ON 7/6/2024] pantoprazole, 40 mg, oral, Daily before breakfast   Or  [START ON 7/6/2024] pantoprazole, 40 mg, intravenous, Daily before breakfast        PRN Medications  PRN medications: acetaminophen **OR** acetaminophen **OR** acetaminophen, acetaminophen **OR** acetaminophen **OR** acetaminophen, benzocaine-menthol, dextromethorphan-guaifenesin, ipratropium-albuteroL, ondansetron ODT **OR** ondansetron  Continuous Medications     Outpatient Medications:          Prior to Admission medications    Medication Sig Start Date End Date Taking? Authorizing  Provider   ipratropium (Atrovent) 21 mcg (0.03 %) nasal spray Administer 2 sprays into each nostril every 12 hours. 1/26/24 2/25/24   Kirill Ramirez MD   multivitamin tablet Take 1 tablet by mouth once daily. 3/25/22     Historical Provider, MD      Labs:       Results from last 7 days   Lab Units 07/05/24  1323   WBC AUTO x10*3/uL 21.9*   RBC AUTO x10*6/uL 4.16   HEMOGLOBIN g/dL 12.3   HEMATOCRIT % 36.9   MCV fL 89   MCH pg 29.6   MCHC g/dL 33.3   RDW % 14.1   PLATELETS AUTO x10*3/uL 364           Results from last 7 days   Lab Units 07/05/24  1323   SODIUM mmol/L 136   POTASSIUM mmol/L 3.8   CHLORIDE mmol/L 102   CO2 mmol/L 24   BUN mg/dL 6   CREATININE mg/dL 0.58   GLUCOSE mg/dL 109*   CALCIUM mg/dL 8.6           Results from last 7 days   Lab Units 07/05/24  1323   MAGNESIUM mg/dL 1.92           Results from last 7 days   Lab Units 07/05/24  1323   TROPHS ng/L 6         Imaging:  CT angio chest for pulmonary embolism  Addendum: Dr. Petty Damon is aware of the findings on 7/5/2024 at 4:08 PM   Signed by William Grimes MD  Narrative: STUDY:  CT Angiogram of the Chest; 7/5/2024 3:13 PM  INDICATION:  Shortness of breath.  COMPARISON:  11/5/2021, 8/2/2020 CTA chest.  ACCESSION NUMBER(S):  [id number]  ORDERING CLINICIAN:  PETTY DAMON  TECHNIQUE:  CTA of the chest was performed with intravenous contrast.   Images are reviewed and processed at a workstation according to the CT  angiogram protocol with 3-D and/or MIP post processing imaging  generated. mL was administered intravenously.  Automated mA/kV exposure control was utilized and patient examination  was performed in strict accordance with principles of ALARA.  FINDINGS:  Pulmonary arteries are adequately opacified without acute or chronic  filling defects.  The thoracic aorta is normal in course and caliber  without dissection or aneurysm.  The heart is normal in size without pericardial effusion.  Thoracic  lymph nodes are not enlarged.  There is no  pleural effusion, pleural thickening, or pneumothorax.   The airways are patent.  There is prominent consolidation in the right lower lobe. Within this  area consolidation, there is a rounded lesion with an air-fluid level.  This measures 2.3 x 2.6 cm.  Upper abdomen demonstrates no acute pathology.  There are no acute fractures.  No suspicious bony lesions.  Impression: 1. No pulmonary emboli.  2. There is prominent consolidation in the right lower lobe. Within  this area of consolidation, there is a rounded lesion with an  air-fluid level. This may represent a lung abscess.  Signed by William Grimes MD  XR chest 2 views  Narrative: STUDY:  Chest Radiographs;  7/5/24 at 1:30 PM  INDICATION:  Cough.  COMPARISON:  Chest XR 9/26/22.  ACCESSION NUMBER(S):  [id number]  ORDERING CLINICIAN:  JEANNINE DAMON  TECHNIQUE:  Frontal and lateral chest.   FINDINGS:  CARDIOMEDIASTINAL SILHOUETTE:  Cardiomediastinal silhouette is normal in size and configuration.     LUNGS:  Clear left hemithorax.  Moderate elevation of the right hemidiaphragm  with right basilar atelectasis.  Underlying right pleural effusion not  excluded.     ABDOMEN:  No remarkable upper abdominal findings.     BONES:  No acute osseous changes.  Impression: Moderate elevation of the right hemidiaphragm with right basilar  atelectasis. Underlying right pleural effusion not excluded.  Signed by Juan Jose Metzger MD  ECG 12 Lead  Sinus tachycardia  Cannot rule out Anterior infarct , age undetermined  T wave abnormality, consider inferior ischemia  Abnormal ECG  When compared with ECG of 06-SEP-2023 09:09,  Vent. rate has increased BY  41 BPM  Minimal criteria for Anterior infarct are now Present  T wave inversion now evident in Inferior leads  See ED provider note for full interpretation and clinical correlation      # Pneumonia   # pulmonary nodules with known cyst vs abscess  - placed patient on meropenem and azithromycin due to abx allergies  -duonebs  initiated  - will consult ID   - continue with supportive care.   - continue home meds as allowed  - telemetry     VTE ppx: lovenox  Diet: cardiac   Code status:  full code    Pulmonary comments:-Agree with treatment of lung abscess with IV azithromycin and meropenem as ordered.  Await infectious disease consult.  Agree with treatment of reactive airway disease versus COPD with generic DuoNebs around-the-clock as ordered.  Patient was appropriately put on oxygen for acute hypoxemic respiratory failure.  Will need home O2 evaluation on discharge.  Prior “lung nodule” was felt to be caused by mucous impaction.  I shall continue to monitor this patient with you and I thank you for the referral.    Martin Kirkland MD

## 2024-07-09 ENCOUNTER — APPOINTMENT (OUTPATIENT)
Dept: ALLERGY | Facility: CLINIC | Age: 54
End: 2024-07-09
Payer: COMMERCIAL

## 2024-07-09 ENCOUNTER — APPOINTMENT (OUTPATIENT)
Dept: RADIOLOGY | Facility: HOSPITAL | Age: 54
End: 2024-07-09
Payer: COMMERCIAL

## 2024-07-09 VITALS
RESPIRATION RATE: 18 BRPM | TEMPERATURE: 97.2 F | BODY MASS INDEX: 38.32 KG/M2 | HEIGHT: 65 IN | WEIGHT: 230 LBS | SYSTOLIC BLOOD PRESSURE: 128 MMHG | HEART RATE: 108 BPM | OXYGEN SATURATION: 90 % | DIASTOLIC BLOOD PRESSURE: 74 MMHG

## 2024-07-09 LAB
ALBUMIN SERPL BCP-MCNC: 3.1 G/DL (ref 3.4–5)
ALP SERPL-CCNC: 76 U/L (ref 33–110)
ALT SERPL W P-5'-P-CCNC: 38 U/L (ref 7–45)
ANION GAP SERPL CALC-SCNC: 11 MMOL/L (ref 10–20)
AST SERPL W P-5'-P-CCNC: 18 U/L (ref 9–39)
BILIRUB SERPL-MCNC: 0.3 MG/DL (ref 0–1.2)
BUN SERPL-MCNC: 9 MG/DL (ref 6–23)
CALCIUM SERPL-MCNC: 9.2 MG/DL (ref 8.6–10.3)
CHLORIDE SERPL-SCNC: 106 MMOL/L (ref 98–107)
CO2 SERPL-SCNC: 26 MMOL/L (ref 21–32)
CREAT SERPL-MCNC: 0.58 MG/DL (ref 0.5–1.05)
EGFRCR SERPLBLD CKD-EPI 2021: >90 ML/MIN/1.73M*2
ERYTHROCYTE [DISTWIDTH] IN BLOOD BY AUTOMATED COUNT: 14.4 % (ref 11.5–14.5)
GLUCOSE SERPL-MCNC: 128 MG/DL (ref 74–99)
HCT VFR BLD AUTO: 35.4 % (ref 36–46)
HGB BLD-MCNC: 11.5 G/DL (ref 12–16)
HOLD SPECIMEN: NORMAL
MCH RBC QN AUTO: 29.3 PG (ref 26–34)
MCHC RBC AUTO-ENTMCNC: 32.5 G/DL (ref 32–36)
MCV RBC AUTO: 90 FL (ref 80–100)
NRBC BLD-RTO: 0 /100 WBCS (ref 0–0)
PLATELET # BLD AUTO: 412 X10*3/UL (ref 150–450)
POTASSIUM SERPL-SCNC: 4.2 MMOL/L (ref 3.5–5.3)
PROT SERPL-MCNC: 6.6 G/DL (ref 6.4–8.2)
RBC # BLD AUTO: 3.92 X10*6/UL (ref 4–5.2)
SODIUM SERPL-SCNC: 139 MMOL/L (ref 136–145)
WBC # BLD AUTO: 12.1 X10*3/UL (ref 4.4–11.3)

## 2024-07-09 PROCEDURE — 2500000002 HC RX 250 W HCPCS SELF ADMINISTERED DRUGS (ALT 637 FOR MEDICARE OP, ALT 636 FOR OP/ED): Performed by: STUDENT IN AN ORGANIZED HEALTH CARE EDUCATION/TRAINING PROGRAM

## 2024-07-09 PROCEDURE — 2500000001 HC RX 250 WO HCPCS SELF ADMINISTERED DRUGS (ALT 637 FOR MEDICARE OP): Performed by: STUDENT IN AN ORGANIZED HEALTH CARE EDUCATION/TRAINING PROGRAM

## 2024-07-09 PROCEDURE — 36415 COLL VENOUS BLD VENIPUNCTURE: CPT | Performed by: STUDENT IN AN ORGANIZED HEALTH CARE EDUCATION/TRAINING PROGRAM

## 2024-07-09 PROCEDURE — 2500000005 HC RX 250 GENERAL PHARMACY W/O HCPCS: Performed by: EMERGENCY MEDICINE

## 2024-07-09 PROCEDURE — 85027 COMPLETE CBC AUTOMATED: CPT | Performed by: STUDENT IN AN ORGANIZED HEALTH CARE EDUCATION/TRAINING PROGRAM

## 2024-07-09 PROCEDURE — 80053 COMPREHEN METABOLIC PANEL: CPT | Performed by: STUDENT IN AN ORGANIZED HEALTH CARE EDUCATION/TRAINING PROGRAM

## 2024-07-09 PROCEDURE — 2500000004 HC RX 250 GENERAL PHARMACY W/ HCPCS (ALT 636 FOR OP/ED): Performed by: STUDENT IN AN ORGANIZED HEALTH CARE EDUCATION/TRAINING PROGRAM

## 2024-07-09 PROCEDURE — 94640 AIRWAY INHALATION TREATMENT: CPT

## 2024-07-09 RX ORDER — CLINDAMYCIN HYDROCHLORIDE 300 MG/1
300 CAPSULE ORAL EVERY 6 HOURS
Qty: 56 CAPSULE | Refills: 0 | Status: SHIPPED | OUTPATIENT
Start: 2024-07-09 | End: 2024-07-23

## 2024-07-09 RX ORDER — IPRATROPIUM BROMIDE 21 UG/1
2 SPRAY, METERED NASAL EVERY 12 HOURS
Qty: 30 ML | Refills: 0 | Status: SHIPPED | OUTPATIENT
Start: 2024-07-09

## 2024-07-09 RX ORDER — AZITHROMYCIN 500 MG/1
500 TABLET, FILM COATED ORAL DAILY
Qty: 14 TABLET | Refills: 0 | Status: SHIPPED | OUTPATIENT
Start: 2024-07-09 | End: 2024-07-23

## 2024-07-09 ASSESSMENT — COGNITIVE AND FUNCTIONAL STATUS - GENERAL
MOBILITY SCORE: 24
DAILY ACTIVITIY SCORE: 24

## 2024-07-09 ASSESSMENT — PAIN - FUNCTIONAL ASSESSMENT: PAIN_FUNCTIONAL_ASSESSMENT: 0-10

## 2024-07-09 ASSESSMENT — PAIN SCALES - GENERAL: PAINLEVEL_OUTOF10: 0 - NO PAIN

## 2024-07-09 NOTE — PROGRESS NOTES
Nelda Hewitt is a 53 y.o. female on day 3 of admission presenting with Pneumonia due to infectious organism, unspecified laterality, unspecified part of lung.    Subjective   Feels better     Expand All Collapse All       Medical Group Progress Note  ASSESSMENT & PLAN:           Patient seen and examined.  Patient in no acute distress.  Denies any fevers chills nausea or vomiting at this point in time.  Reports that shortness of breath is improving.  Denies any worsening cough or chest pain.     Last Recorded Vitals:  Vitals          Vitals:     07/07/24 2024 07/08/24 0717 07/08/24 0915 07/08/24 1503   BP: 132/72   129/57 129/69   BP Location: Left arm     Left arm   Patient Position:       Sitting   Pulse: 99   101 94   Resp: 17         Temp: (!) 38 °C (100.4 °F)   36.3 °C (97.3 °F) 37.1 °C (98.8 °F)   TempSrc: Temporal     Temporal   SpO2: 91% 90% 91% 92%   Weight:           Height:                 Last I/O:  I/O last 3 completed shifts:  In: 600 (5.8 mL/kg) [IV Piggyback:600]  Out: - (0 mL/kg)   Weight: 104.3 kg      Physical Exam     Constitutional:    No acute distress  HENT:      Head: Normocephalic.      Nose: Congestion  resolved  Eyes:      Pupils: Pupils are equal, round, and reactive to light.   Cardiovascular:      Rate and Rhythm: Regular rhythm. Tachycardia present.      Pulses: Normal pulses.      Heart sounds: Normal heart sounds.   Pulmonary:      Effort: Respiratory distress  resolved.      Breath sounds: Wheezing and rhonchi present but significantly decreased.   Abdominal:      General: Bowel sounds are normal.      Palpations: Abdomen is soft.      Tenderness: There is abdominal tenderness.   Musculoskeletal:         General: Tenderness present. No swelling.   Neurological:      General: No focal deficit present.      Mental Status: Mental status is at baseline.     Inpatient Medications:  acetylcysteine, 3 mL, nebulization, 4x daily  docusate sodium, 100 mg, oral, BID  enoxaparin, 40 mg,  subcutaneous, q24h  ipratropium-albuteroL, 3 mL, nebulization, TID  melatonin, 3 mg, oral, Daily  meropenem, 1 g, intravenous, q8h  oxygen, , inhalation, Continuous - Inhalation  pantoprazole, 40 mg, oral, Daily before breakfast   Or  pantoprazole, 40 mg, intravenous, Daily before breakfast     PRN Medications  PRN medications: acetaminophen **OR** acetaminophen **OR** acetaminophen, acetaminophen **OR** acetaminophen **OR** acetaminophen, benzocaine-menthol, dextromethorphan-guaifenesin, ipratropium-albuteroL, ondansetron ODT **OR** ondansetron  Continuous Medications:  LABS AND IMAGING:      Labs:         Results from last 7 days   Lab Units 07/08/24  0430 07/07/24  0514 07/06/24  0513   WBC AUTO x10*3/uL 15.2* 18.0* 17.9*   RBC AUTO x10*6/uL 3.84* 3.73* 3.80*   HEMOGLOBIN g/dL 11.3* 10.9* 11.1*   HEMATOCRIT % 34.7* 33.9* 34.0*   MCV fL 90 91 90   MCH pg 29.4 29.2 29.2   MCHC g/dL 32.6 32.2 32.6   RDW % 14.4 14.2 14.0   PLATELETS AUTO x10*3/uL 422 395 378             Results from last 7 days   Lab Units 07/08/24  0429 07/07/24  0514 07/06/24  0513   SODIUM mmol/L 137 141 138   POTASSIUM mmol/L 3.9 3.8 3.9   CHLORIDE mmol/L 105 106 105   CO2 mmol/L 24 27 26   BUN mg/dL 10 14 12   CREATININE mg/dL 0.59 0.59 0.57   GLUCOSE mg/dL 100* 106* 156*   PROTEIN TOTAL g/dL 6.3* 6.5 6.8   CALCIUM mg/dL 8.4* 8.4* 8.6   BILIRUBIN TOTAL mg/dL 0.4 0.2 0.3   ALK PHOS U/L 73 75 80   AST U/L 18 22 8*   ALT U/L 37 37 21           Results from last 7 days   Lab Units 07/05/24  1323   MAGNESIUM mg/dL 1.92           Results from last 7 days   Lab Units 07/05/24  1323   TROPHS ng/L 6      Imaging:  CT angio chest for pulmonary embolism  Addendum: Dr. Petty Watson is aware of the findings on 7/5/2024 at 4:08 PM   Signed by William Grimes MD  Narrative: STUDY:  CT Angiogram of the Chest; 7/5/2024 3:13 PM  INDICATION:  Shortness of breath.  COMPARISON:  11/5/2021, 8/2/2020 CTA chest.  ACCESSION NUMBER(S):  [id number]  ORDERING  CLINICIAN:  JEANNINE DAMON  TECHNIQUE:  CTA of the chest was performed with intravenous contrast.   Images are reviewed and processed at a workstation according to the CT  angiogram protocol with 3-D and/or MIP post processing imaging  generated. mL was administered intravenously.  Automated mA/kV exposure control was utilized and patient examination  was performed in strict accordance with principles of ALARA.  FINDINGS:  Pulmonary arteries are adequately opacified without acute or chronic  filling defects.  The thoracic aorta is normal in course and caliber  without dissection or aneurysm.  The heart is normal in size without pericardial effusion.  Thoracic  lymph nodes are not enlarged.  There is no pleural effusion, pleural thickening, or pneumothorax.   The airways are patent.  There is prominent consolidation in the right lower lobe. Within this  area consolidation, there is a rounded lesion with an air-fluid level.  This measures 2.3 x 2.6 cm.  Upper abdomen demonstrates no acute pathology.  There are no acute fractures.  No suspicious bony lesions.  Impression: 1. No pulmonary emboli.  2. There is prominent consolidation in the right lower lobe. Within  this area of consolidation, there is a rounded lesion with an  air-fluid level. This may represent a lung abscess.  Signed by William Grimes MD  XR chest 2 views  Narrative: STUDY:  Chest Radiographs;  7/5/24 at 1:30 PM  INDICATION:  Cough.  COMPARISON:  Chest XR 9/26/22.  ACCESSION NUMBER(S):  [id number]  ORDERING CLINICIAN:  JEANNINE DAMON  TECHNIQUE:  Frontal and lateral chest.   FINDINGS:  CARDIOMEDIASTINAL SILHOUETTE:  Cardiomediastinal silhouette is normal in size and configuration.     LUNGS:  Clear left hemithorax.  Moderate elevation of the right hemidiaphragm  with right basilar atelectasis.  Underlying right pleural effusion not  excluded.     ABDOMEN:  No remarkable upper abdominal findings.     BONES:  No acute osseous  changes.  Impression: Moderate elevation of the right hemidiaphragm with right basilar  atelectasis. Underlying right pleural effusion not excluded.  Signed by Juan Jose Metzger MD  ECG 12 Lead  Sinus tachycardia  Cannot rule out Anterior infarct , age undetermined  T wave abnormality, consider inferior ischemia  Abnormal ECG  When compared with ECG of 06-SEP-2023 09:09,  Vent. rate has increased BY  41 BPM  Minimal criteria for Anterior infarct are now Present  T wave inversion now evident in Inferior leads  See ED provider note for full interpretation and clinical correlation         Assessment/plan:-     # Pneumonia   # Lung abscess; Likely bacterial, doubt tuberculosis or malignancy.  - placed patient on meropenem and azithromycin due to abx allergies  -duonebs initiated  .   - will consult ID   - continue with supportive care.   - continue home meds as allowed  - telemetry     VTE ppx: lovenox  Diet: cardiac   Code status:  full code        07/06  -  Patient seen and assessed during rounds.  Patient remains on supplemental oxygen.  -  Continuing on meropenem azithromycin for now  - added Mucomyst to patient's regimen.  Also ordered Acapella.  Have both scheduled and as needed DuoNebs  - will continue to monitor  On telemetry.  -  Continue home meds     07/07  -  Patient has significantly improved today and is currently off oxygen.  -  Continuing with meropenem and azithromycin this point in time.  Will continue with Mucomyst as well as DuoNebs  .-  Continue with incentive spirometer and Acapella  -Labs to show some resolving leukocytosis.  Patient  has had multiple infections in the recent past.  Will check  immunoglobulin levels.  -   Continue home meds as allowed.  Will discuss patient with infectious disease for appropriate possible oral regimen if patient stable enough to be discharged home tomorrow     07/08  - patient continues to improve.  -   Lung nodule is mostly due to mucous infection and hence this is  not a true  lung nodule.  Will need to follow-up in terms of lung nodule given that it is not it was mostly due to mucus impaction.  -  White count decreasing.  Will continue to monitor  -  Continue with current breathing treatment regimen.   Pulmonary Comments:-   -Okay to discharge home in a.m. on clindamycin 300 mg p.o. every 6 x 2 weeks and azithromycin 500 mg p.o. daily x 2 weeks.  Right extremely limited in the chart of antibiotics in this patient with multiple manage of drug allergies to penicillin, doxycycline, levofloxacin and vancomycin.  Will follow-up in 10 days after discharge.       Martin Kirkland MD

## 2024-07-09 NOTE — CARE PLAN
The patient's goals for the shift include to rest    The clinical goals for the shift include maintain comfort and safety      Problem: Pain - Adult  Goal: Verbalizes/displays adequate comfort level or baseline comfort level  Outcome: Progressing     Problem: Safety - Adult  Goal: Free from fall injury  Outcome: Progressing

## 2024-07-09 NOTE — DISCHARGE SUMMARY
Medical [facility] Discharge Summary  DISCHARGE DIAGNOSIS     ***    HOSPITAL COURSE AND DETAILS     ***  No notes on file       > 30 minutes was spent on discharge services.    -----This note was prepared using Dragon Medical voice recognition software. As a result, errors may occur. When identified, these errors have been corrected. While every attempt is made to correct errors during dictation, errors may still exist.------    Sahra Mc MD    DISCHARGE PHYSICAL EXAM     Last Recorded Vitals:  Vitals:    07/08/24 2050 07/09/24 0222 07/09/24 0717 07/09/24 0807   BP: 133/64 121/81  128/74   BP Location: Left arm Left arm     Patient Position: Lying Lying     Pulse: 99 81  108   Resp: 18 18     Temp: 36.8 °C (98.2 °F) 36.5 °C (97.7 °F)  36.2 °C (97.2 °F)   TempSrc: Temporal Temporal     SpO2: 92% 94% 92% 90%   Weight:       Height:           Physical Exam      DISCHARGE MEDICATIONS        Your medication list        START taking these medications        Instructions Last Dose Given Next Dose Due   azithromycin 500 mg tablet  Commonly known as: Zithromax      Take 1 tablet (500 mg) by mouth once daily for 14 days.       clindamycin 300 mg capsule  Commonly known as: Cleocin      Take 1 capsule (300 mg) by mouth every 6 hours for 14 days.       lactobacillus acidophilus & bulgar 1 million cell chewable tablet  Commonly known as: Lactinex      Chew 1 tablet 3 times daily (morning, midday, late afternoon) for 14 days.              CONTINUE taking these medications        Instructions Last Dose Given Next Dose Due   ipratropium 21 mcg (0.03 %) nasal spray  Commonly known as: Atrovent      Administer 2 sprays into each nostril every 12 hours.       multivitamin tablet                     Where to Get Your Medications        These medications were sent to [id number] - [place], [place] - [address]  [address], [place] [address]      Phone: [phone]   azithromycin 500 mg  tablet  clindamycin 300 mg capsule  ipratropium 21 mcg (0.03 %) nasal spray  lactobacillus acidophilus & bulgar 1 million cell chewable tablet           OUTPATIENT FOLLOW-UP     Future Appointments   Date Time Provider Department Center   7/11/2024  4:00 PM MD Keaton [email or web address] [place]   8/19/2024  5:00 PM William Feliciano MD [id number] [place]   8/20/2024  8:00 AM Sherie Manley RD, LD [id number] [place]   9/3/2024  4:00 PM Rosalinda Cronin DPM [id number] [place]   11/5/2024 10:00 AM Laura Balbuena MD [id number] [place]   3/31/2025  9:00 AM MD STEVEN AtkinsoneadoABPC1 [place]

## 2024-07-10 ENCOUNTER — PATIENT OUTREACH (OUTPATIENT)
Dept: PRIMARY CARE | Facility: CLINIC | Age: 54
End: 2024-07-10
Payer: COMMERCIAL

## 2024-07-10 NOTE — PROGRESS NOTES
Discharge Facility: [facility]  Discharge Diagnosis: Pneumonia due to infectious organism  Admission Date: 7/5/2024  Discharge Date: 7/9/2024    PCP Appointment Date: 7/11/2024  Specialist Appointment Date:    -urology 8/19/2024  -nutrition 8/20/2024  -podiatry 9/3/2024  -allergy 11/5/2024  -Schedule an appointment with Martin Kirkland as soon as possible for a visit in 10 day(s)    Hospital Encounter and Summary Linked: Yes    START taking:   azithromycin (Zithromax)   clindamycin (Cleocin)  lactobacillus acidophilus & bulgar (Lactinex)     No TCM call completed. Patient has an appointment scheduled within 2 business days of hospital discharge.

## 2024-07-11 ENCOUNTER — APPOINTMENT (OUTPATIENT)
Dept: PRIMARY CARE | Facility: CLINIC | Age: 54
End: 2024-07-11
Payer: COMMERCIAL

## 2024-07-11 VITALS
OXYGEN SATURATION: 96 % | HEART RATE: 72 BPM | SYSTOLIC BLOOD PRESSURE: 112 MMHG | RESPIRATION RATE: 18 BRPM | TEMPERATURE: 97.6 F | DIASTOLIC BLOOD PRESSURE: 80 MMHG | HEIGHT: 65 IN | BODY MASS INDEX: 38.49 KG/M2 | WEIGHT: 231 LBS

## 2024-07-11 DIAGNOSIS — J18.9 PNEUMONIA DUE TO INFECTIOUS ORGANISM, UNSPECIFIED LATERALITY, UNSPECIFIED PART OF LUNG: Primary | ICD-10-CM

## 2024-07-11 DIAGNOSIS — R91.1 LUNG NODULE SEEN ON IMAGING STUDY: ICD-10-CM

## 2024-07-11 PROCEDURE — 3008F BODY MASS INDEX DOCD: CPT | Performed by: FAMILY MEDICINE

## 2024-07-11 PROCEDURE — 99496 TRANSJ CARE MGMT HIGH F2F 7D: CPT | Performed by: FAMILY MEDICINE

## 2024-07-11 RX ORDER — BROMPHENIRAMINE MALEATE, PSEUDOEPHEDRINE HYDROCHLORIDE, AND DEXTROMETHORPHAN HYDROBROMIDE 2; 30; 10 MG/5ML; MG/5ML; MG/5ML
SYRUP ORAL
COMMUNITY
Start: 2024-06-26

## 2024-07-11 NOTE — PROGRESS NOTES
Subjective   Nelda Hewitt is a 53 y.o. female who presents for Hospital Follow-up.  Hospital Follow-up  This 53-year-old female developed signs of infection and shortness of breath and was hospitalized where she was determined to have a large right-sided lower lobe pneumonia with abscess formation.  She was treated with intravenous meropenem and Zithromax and showed significant recovery.  Pulmonology and infectious disease were consulted.  No mass or nodule was identified on CT imaging.  As she clinically resolved and her leukocytosis improved she was transition to:  clindamycin 300 mg p.o. every 6 x 2 weeks and azithromycin 500 mg p.o. daily x 2 weeks   She is feeling much better, having minimal cough, and denies shortness of breath today  HPI         Visit Vitals  /80   Pulse 72   Temp 36.4 °C (97.6 °F)   Resp 18      Objective   Physical Exam  Constitutional:       Appearance: Normal appearance.   HENT:      Head: Normocephalic.   Eyes:      Conjunctiva/sclera: Conjunctivae normal.   Cardiovascular:      Rate and Rhythm: Normal rate and regular rhythm.      Heart sounds: Normal heart sounds.   Pulmonary:      Effort: Pulmonary effort is normal.      Breath sounds: Rhonchi (slight at the right base) present.   Musculoskeletal:      Cervical back: Neck supple.   Skin:     General: Skin is warm and dry.   Neurological:      Mental Status: She is alert.       Assessment/Plan      Problem List Items Addressed This Visit       Pneumonia due to infectious organism, unspecified laterality, unspecified part of lung - Primary     This 53-year-old female was hospitalized and treated for a significant right lower lobe pneumonia with abscess formation.  She responded well clinically and was transition to oral clindamycin and Zithromax which she will complete a 2-week course.  We reviewed the images of this recent and former CT carefully together in the office.  3 months ago she had a routine screening CT which did  show a 1 cm lung nodule on the right side in the general vicinity of the pneumonia.  It was not particularly suspicious at the time.  The pulmonologist in the hospital did not feel that this was suspicious for malignancy but did recommend follow-up to ensure resolution.  We will go ahead and schedule a CT scan of the lung in 3 months to ensure that there is good resolution of the infiltrate and no suspicious characteristics of that nodule.         Relevant Orders    CT lung screening follow up CT chest wo IV contrast     Other Visit Diagnoses       Lung nodule seen on imaging study        Relevant Orders    CT lung screening follow up CT chest wo IV contrast               Please excuse any errors in grammar or translation related to this dictation. Voice recognition software was utilized to prepare this document.

## 2024-07-11 NOTE — ASSESSMENT & PLAN NOTE
This 53-year-old female was hospitalized and treated for a significant right lower lobe pneumonia with abscess formation.  She responded well clinically and was transition to oral clindamycin and Zithromax which she will complete a 2-week course.  We reviewed the images of this recent and former CT carefully together in the office.  3 months ago she had a routine screening CT which did show a 1 cm lung nodule on the right side in the general vicinity of the pneumonia.  It was not particularly suspicious at the time.  The pulmonologist in the hospital did not feel that this was suspicious for malignancy but did recommend follow-up to ensure resolution.  We will go ahead and schedule a CT scan of the lung in 3 months to ensure that there is good resolution of the infiltrate and no suspicious characteristics of that nodule.

## 2024-07-13 LAB
ATRIAL RATE: 116 BPM
P AXIS: 40 DEGREES
P OFFSET: 204 MS
P ONSET: 141 MS
PR INTERVAL: 166 MS
Q ONSET: 224 MS
QRS COUNT: 19 BEATS
QRS DURATION: 72 MS
QT INTERVAL: 294 MS
QTC CALCULATION(BAZETT): 408 MS
QTC FREDERICIA: 366 MS
R AXIS: 12 DEGREES
T AXIS: -55 DEGREES
T OFFSET: 371 MS
VENTRICULAR RATE: 116 BPM

## 2024-07-16 ENCOUNTER — PATIENT OUTREACH (OUTPATIENT)
Dept: PRIMARY CARE | Facility: CLINIC | Age: 54
End: 2024-07-16
Payer: COMMERCIAL

## 2024-08-06 ENCOUNTER — APPOINTMENT (OUTPATIENT)
Dept: RADIOLOGY | Facility: HOSPITAL | Age: 54
End: 2024-08-06
Payer: COMMERCIAL

## 2024-08-12 DIAGNOSIS — R89.6 ABNORMAL CYTOLOGY: ICD-10-CM

## 2024-08-15 ENCOUNTER — PATIENT OUTREACH (OUTPATIENT)
Dept: PRIMARY CARE | Facility: CLINIC | Age: 54
End: 2024-08-15
Payer: COMMERCIAL

## 2024-08-15 NOTE — DISCHARGE SUMMARY
Discharge Diagnosis  Pneumonia due to infectious organism, unspecified laterality, unspecified part of lung    Issues Requiring Follow-Up   pulmonary function,  follow-up with pulmonology    Discharge Meds     Your medication list        START taking these medications        Instructions Last Dose Given Next Dose Due   azithromycin 500 mg tablet  Commonly known as: Zithromax      Take 1 tablet (500 mg) by mouth once daily for 14 days.       clindamycin 300 mg capsule  Commonly known as: Cleocin      Take 1 capsule (300 mg) by mouth every 6 hours for 14 days.       lactobacillus acidophilus & bulgar 1 million cell chewable tablet  Commonly known as: Lactinex      Chew 1 tablet 3 times daily (morning, midday, late afternoon) for 14 days.              CONTINUE taking these medications        Instructions Last Dose Given Next Dose Due   ipratropium 21 mcg (0.03 %) nasal spray  Commonly known as: Atrovent      Administer 2 sprays into each nostril every 12 hours.       multivitamin tablet                     Where to Get Your Medications        These medications were sent to [id number] - [place], [place] - [address]  [address] [employer] in [address]) , [place] [address]      Phone: [phone]   azithromycin 500 mg tablet  clindamycin 300 mg capsule  ipratropium 21 mcg (0.03 %) nasal spray  lactobacillus acidophilus & bulgar 1 million cell chewable tablet         Test Results Pending At Discharge  Pending Labs       No current pending labs.            Hospital Course    # Pneumonia   # pulmonary nodules with known cyst vs abscess  - placed patient on meropenem and azithromycin due to abx allergies  -duonebs initiated  - pulmonology consulted.   - will consult ID   - continue with supportive care.   - continue home meds as allowed  - telemetry     VTE ppx: lovenox  Diet: cardiac   Code status:  full code        07/06  -  Patient seen and assessed during rounds.  Patient remains on supplemental oxygen.  -   Continuing on meropenem azithromycin for now  - added Mucomyst to patient's regimen.  Also ordered Acapella.  Have both scheduled and as needed DuoNebs  - will continue to monitor  On telemetry.  -  Continue home meds     07/07  -  Patient has significantly improved today and is currently off oxygen.  -  Continuing with meropenem and azithromycin this point in time.  Will continue with Mucomyst as well as DuoNebs  .-  Continue with incentive spirometer and Acapella  -Labs to show some resolving leukocytosis.  Patient  has had multiple infections in the recent past.  Will check  immunoglobulin levels.  -   Continue home meds as allowed.  Will discuss patient with infectious disease for appropriate possible oral regimen if patient stable enough to be discharged home tomorrow     07/08  - patient continues to improve.  -   Seen and assessed by pulmonology who are recommending that patient have home O2 evaluation on discharge.  Will need to follow-up in terms of lung nodule given that it is not it was mostly due to mucus impaction.  -  White count decreasing.  Will continue to monitor  - will await pulmonology clearance before discharge.  -  Continue with current breathing treatment regimen.     per pulmonology patient was discharged home on clindamycin 300 mg p.o. every 6 hours as well as  azithromycin 500 mg daily for 2 weeks.  plan is for follow-up with pulmonology in 10 days status post discharge      Pertinent Physical Exam At Time of Discharge  Physical Exam    Constitutional:    No acute distress  HENT:      Head: Normocephalic.      Nose: Congestion  resolved  Eyes:      Pupils: Pupils are equal, round, and reactive to light.   Cardiovascular:      Rate and Rhythm: Regular rhythm. Tachycardia present.      Pulses: Normal pulses.      Heart sounds: Normal heart sounds.   Pulmonary:      Effort: Respiratory distress  resolved.      Breath sounds: Wheezing and rhonchi present but significantly decreased.    Abdominal:      General: Bowel sounds are normal.      Palpations: Abdomen is soft.      Tenderness: There is abdominal tenderness.   Musculoskeletal:         General: Tenderness present. No swelling.   Neurological:      General: No focal deficit present.      Mental Status: Mental status is at baseline.    Outpatient Follow-Up  Future Appointments   Date Time Provider Department Center   8/19/2024  5:00 PM William Feliciano MD [id number] [place]   8/27/2024  2:00 PM Elgin Grossman DO [id number] [place]   9/3/2024  4:00 PM Rosalinda Cronin DPM [id number] [place]   11/5/2024 10:00 AM Laura Balbuena MD [id number] [place]   3/31/2025  9:00 AM Kirill Ramirez MD [id number] [place]         Sahra Mc MD

## 2024-08-20 ENCOUNTER — APPOINTMENT (OUTPATIENT)
Dept: NUTRITION | Facility: CLINIC | Age: 54
End: 2024-08-20
Payer: COMMERCIAL

## 2024-08-27 ENCOUNTER — APPOINTMENT (OUTPATIENT)
Dept: OTOLARYNGOLOGY | Facility: CLINIC | Age: 54
End: 2024-08-27
Payer: COMMERCIAL

## 2024-08-27 VITALS
TEMPERATURE: 97.4 F | HEIGHT: 65 IN | BODY MASS INDEX: 38.44 KG/M2 | DIASTOLIC BLOOD PRESSURE: 84 MMHG | SYSTOLIC BLOOD PRESSURE: 120 MMHG

## 2024-08-27 DIAGNOSIS — H61.22 IMPACTED CERUMEN OF LEFT EAR: ICD-10-CM

## 2024-08-27 DIAGNOSIS — H92.02 LEFT EAR PAIN: Primary | ICD-10-CM

## 2024-08-27 PROCEDURE — 69210 REMOVE IMPACTED EAR WAX UNI: CPT | Performed by: OTOLARYNGOLOGY

## 2024-08-27 PROCEDURE — 1036F TOBACCO NON-USER: CPT | Performed by: OTOLARYNGOLOGY

## 2024-08-27 PROCEDURE — 99213 OFFICE O/P EST LOW 20 MIN: CPT | Performed by: OTOLARYNGOLOGY

## 2024-08-27 NOTE — PROGRESS NOTES
Impression:  1. Left ear pain        2. Impacted cerumen of left ear             RECOMMENDATIONS/PLAN :  Using the operative microscope and suction, I was able to remove all of her impacted cerumen and she was feeling much better.  She will continue to avoid Q-tips and eventually we will check an audiogram.  She can follow-up as needed.      **This electronic medical record note was created with the use of voice recognition software.  Despite proofreading, typographical or grammatical errors may be present that could affect meaning of content **    Subjective   Patient ID:     Nelda Hewitt is a 54 y.o. female who presents to the office today complaining that her left ear is plugged.  She denies any drainage from the ears or any upper respiratory complaints fever or chills.  No problems in the right ear.  No imbalance or vertigo.    ROS:  A detailed 12 system review of systems is noted on the intake form has been reviewed with the patient with details noted in the HPI and scanned into the patient's medical record.    Objective     Past Medical History:   Diagnosis Date    Allergic     Atherosclerotic heart disease of native coronary artery without angina pectoris     Coronary artery disease involving native coronary artery, angina presence unspecified, unspecified whether native or transplanted heart    Personal history of other diseases of the respiratory system 05/18/2019    History of COPD        Past Surgical History:   Procedure Laterality Date    APPENDECTOMY      CHOLECYSTECTOMY      HYSTERECTOMY      due to DUB    OTHER SURGICAL HISTORY  05/18/2019    Oophorectomy    OTHER SURGICAL HISTORY  11/04/2019    Renal lithotripsy    TUBAL LIGATION          Allergies   Allergen Reactions    Doxycycline Nausea/vomiting    Levofloxacin Hives    Penicillins Hives    Vancomycin Rash          Current Outpatient Medications:     ipratropium (Atrovent) 21 mcg (0.03 %) nasal spray, Administer 2 sprays into each nostril  "every 12 hours., Disp: 30 mL, Rfl: 0    multivitamin tablet, Take 1 tablet by mouth once daily., Disp: , Rfl:     brompheniramine-pseudoeph-DM 2-30-10 mg/5 mL syrup, take 10ML BY MOUTH EVERY 4 TO 6 HOURS AS NEEDED for cough/congestion/runny nose, Disp: , Rfl:      Tobacco Use: Medium Risk (8/27/2024)    Patient History     Smoking Tobacco Use: Former     Smokeless Tobacco Use: Never     Passive Exposure: Not on file        Alcohol Use: Alcohol Misuse (7/5/2024)    AUDIT-C     Frequency of Alcohol Consumption: 2-4 times a month     Average Number of Drinks: 1 or 2     Frequency of Binge Drinking: Weekly        Social History     Substance and Sexual Activity   Drug Use Never        Physical Exam:  Visit Vitals  /84   Temp 36.3 °C (97.4 °F) (Temporal)   Ht 1.651 m (5' 5\")   BMI 38.44 kg/m²   OB Status Hysterectomy   Smoking Status Former   BSA 2.19 m²      General: Patient is alert, oriented, cooperative in no apparent distress.  Head: Normocephalic, atraumatic.  Eyes: PERRL, EOMI, Conjunctiva is clear. No nystagmus.  Ears: Right Ear-- Pinna is normal.  External auditory canal is patent. Tympanic membrane is intact and she does have a retraction pocket.  TM has good mobility with my pneumatic otoscope. No effusion].  Mastoid is nontender.  Left ear-- Pinna is normal.  External auditory canal is occluded with cerumen. Tympanic membrane is intact and she does have a retraction pocket.  TM has good mobility.  No effusion..  Mastoid is nontender.    Results:   []    Procedure:   After informed consent was obtained with the risks, benefits, complications, and alternatives explained to the patient / guardian, the patient was laid back in the ENT chair and the operative microscope was brought to the [left] ear.  A speculum was placed and using the operative microscope and/or curette/ suction, I was able to carefully remove all of the impacted cerumen that was causing pain/ hearing loss.  After doing so, the patient was " feeling much better and had much less pain.  The TM was [intact, translucent and had good mobility with my pneumatic otoscope].  The patient tolerated the procedure well and there were no complications.      Elgin Grossman, DO

## 2024-09-03 ENCOUNTER — APPOINTMENT (OUTPATIENT)
Dept: PODIATRY | Facility: CLINIC | Age: 54
End: 2024-09-03
Payer: COMMERCIAL

## 2024-09-03 DIAGNOSIS — M72.2 PLANTAR FASCIITIS: Primary | ICD-10-CM

## 2024-09-03 PROCEDURE — 99203 OFFICE O/P NEW LOW 30 MIN: CPT | Performed by: PODIATRIST

## 2024-09-03 NOTE — PROGRESS NOTES
CC: b/l heel pain bumps heel of left foot.     PCP Kirill Ramirez MD  Last visit 7/11/24    HPI:  This 54 y.o. female presents complaining of pain in the b/l heel x 6 months.  Denies trauma. Onset was gradual with worsening course until recently.  Denies wt gain of > 10lbs in the last month and denies starting a new exercise program. Patient has burning, numbness or tingling. Pain is moderate  and rated as 6 /10 after getting up in AM no improvement after prolonged WB.  Denies any swelling or redness.  Patient is using inserts.  Not been taking analgesics with some relief.  Denies any other pedal complaints.    PMH  Past Medical History:   Diagnosis Date    Allergic     Atherosclerotic heart disease of native coronary artery without angina pectoris     Coronary artery disease involving native coronary artery, angina presence unspecified, unspecified whether native or transplanted heart    Personal history of other diseases of the respiratory system 05/18/2019    History of COPD     MEDS    Current Outpatient Medications:     brompheniramine-pseudoeph-DM 2-30-10 mg/5 mL syrup, take 10ML BY MOUTH EVERY 4 TO 6 HOURS AS NEEDED for cough/congestion/runny nose, Disp: , Rfl:     ipratropium (Atrovent) 21 mcg (0.03 %) nasal spray, Administer 2 sprays into each nostril every 12 hours., Disp: 30 mL, Rfl: 0    multivitamin tablet, Take 1 tablet by mouth once daily., Disp: , Rfl:   Allergies  Allergies   Allergen Reactions    Doxycycline Nausea/vomiting    Levofloxacin Hives    Penicillins Hives    Vancomycin Rash     Social History     Socioeconomic History    Marital status:    Tobacco Use    Smoking status: Former     Current packs/day: 0.00     Average packs/day: 1 pack/day for 30.0 years (30.0 ttl pk-yrs)     Types: Cigarettes     Start date: 1/1/1984     Quit date: 1/1/2014     Years since quitting: 10.6    Smokeless tobacco: Never   Vaping Use    Vaping status: Never Used   Substance and Sexual Activity    Alcohol  use: Not Currently     Alcohol/week: 2.0 standard drinks of alcohol     Types: 2 Standard drinks or equivalent per week    Drug use: Never    Sexual activity: Yes     Partners: Male     Birth control/protection: None     Social Determinants of Health     Financial Resource Strain: Low Risk  (7/5/2024)    Overall Financial Resource Strain (CARDIA)     Difficulty of Paying Living Expenses: Not hard at all   Transportation Needs: No Transportation Needs (7/5/2024)    PRAPARE - Transportation     Lack of Transportation (Medical): No     Lack of Transportation (Non-Medical): No   Housing Stability: Low Risk  (7/5/2024)    Housing Stability Vital Sign     Unable to Pay for Housing in the Last Year: No     Number of Places Lived in the Last Year: 1     Unstable Housing in the Last Year: No     Family History   Problem Relation Name Age of Onset    COPD Father Tc Cutlip     Emphysema Father Tc Cutlip     Breast cancer Maternal Grandmother Daria Cherry     Diabetes Maternal Grandmother Daria Cherry     Heart attack Maternal Grandfather       Past Surgical History:   Procedure Laterality Date    APPENDECTOMY      CHOLECYSTECTOMY      HYSTERECTOMY      due to DUB    OTHER SURGICAL HISTORY  05/18/2019    Oophorectomy    OTHER SURGICAL HISTORY  11/04/2019    Renal lithotripsy    TUBAL LIGATION         REVIEW OF SYSTEMS  MSK: + as noted in HPI.      Physical examination:   On General Observation: Patient is a pleasant, cooperative, well developed 54 y.o.  adult female. The patient is alert and oriented to time, place and person. Patient has normal affect and mood.  There were no vitals taken for this visit.    Vascular:  DP and PT pulses are palpable.  CFT less than 3 seconds to all digits bilateral.  Skin temperature is warm to warm from proximal to distal bilateral.  Hair growth is noted.   No varicosities noted.      Neuro:  Light touch intact bilateral. Protective sensation intact at all pedal sites via [place]  Carlos Alberto 5.07 monofilament bilateral.  Proprioception intact at the hallux bilateral.  No clonus noted.  Babinski reflex not elicited bilateral.    Derm:  Skin texture and turgor within normal limits.  Toenails normal in appearance.  Webspaces 1-4 clean, dry, intact bilateral.  No rashes, subcutaneous nodules, or open lesions noted.  No hyperkeratotic tissue. No erythema    Muscle strength is 5/5 for all instrinsic and extrinsic muscle groups.   General foot morphology:  ROM of the STJ and MTJ is  without pain or crepitus. Pain with palpation to the plantar medial aspect of the b/l heel at the insertion site of the central band of the plantar fascia No pain with palpation/compression to posterior calcaneal tuber.  No signs of calcaneal stress fracture.  No signs of Olson's nerve entrapment.  Negative straight leg raise test.  No evidence of nodularity or fibromatosis.        ASSESSMENT:  This is a 54 y.o. patient presenting with b/l, plantar fasciitis     PLAN:   A comprehensive history and physical examination were preformed. The patient was educated on clinical and radiographic findings, diagnosis and treatment plans. Patient state that she understands all that has been explained and all questions were answered to her apparent satisfaction.     -We discussed the etiology of the heel complaints as well as the plantar fasciitis treatment protocol.  -Pt to to begin stretching, icing,  and wearing appropriate shoes.  No barefoot walking.  -Demonstrated stretching exercises to the pt - dispensed literature.   -Recommended using OTC arch supports, discussed details with patient  -Advised patient on use of anti-inflammatory medications .   - Consider custom inserts at next visit if no improvement  Follow up in  4 weeks or prn

## 2024-09-17 ENCOUNTER — PATIENT OUTREACH (OUTPATIENT)
Dept: PRIMARY CARE | Facility: CLINIC | Age: 54
End: 2024-09-17
Payer: COMMERCIAL

## 2024-10-21 ENCOUNTER — HOSPITAL ENCOUNTER (OUTPATIENT)
Dept: RADIOLOGY | Facility: HOSPITAL | Age: 54
Discharge: HOME | End: 2024-10-21
Payer: COMMERCIAL

## 2024-10-21 DIAGNOSIS — R91.1 LUNG NODULE SEEN ON IMAGING STUDY: ICD-10-CM

## 2024-10-21 DIAGNOSIS — J18.9 PNEUMONIA DUE TO INFECTIOUS ORGANISM, UNSPECIFIED LATERALITY, UNSPECIFIED PART OF LUNG: ICD-10-CM

## 2024-10-21 PROCEDURE — 71250 CT THORAX DX C-: CPT

## 2024-10-21 PROCEDURE — 76380 CAT SCAN FOLLOW-UP STUDY: CPT | Performed by: RADIOLOGY

## 2024-11-04 NOTE — PROGRESS NOTES
"  Subjective   Patient ID:   [id number]   Nelda Hewitt \"Eyad" is a 54 y.o. female who presents for Medication Reaction.    Chief Complaint   Patient presents with    Medication Reaction      This is a new patient, referred by Kirill Ramirez MD, PCP, for evaluation of drug intolerances/allergies.    Patient reports she had 2 episodes of pneumonia; the first was 5 or 6  years ago.  The second one was on 7-11-24 and she notes it was the worst illness she has ever had.  Her course was complicated due to an associated empyema.  She had a Pneumovax in 2015.  She an appointment scheduled here at that time.  She admits to several ear and sinus infections.    Patient has a H/O urticaria after having penicillin as a child but does not recall the details.  In 2-, she also developed urticaria with Levaquin March 2017, but does not recall the details.  She also developed urticaria with vancomycin but does not remember details.  She states doxycycline has a SE of emesis immediately after she takes it.    She has a history of chronic idiopathic urticaria    Objective   /80   Pulse 82   Ht 1.651 m (5' 5\")   SpO2 95%   BMI 38.44 kg/m²      Physical Exam  Constitutional:       Appearance: Normal appearance.   HENT:      Head: Normocephalic and atraumatic.   Eyes:      Extraocular Movements: Extraocular movements intact.      Conjunctiva/sclera: Conjunctivae normal.      Pupils: Pupils are equal, round, and reactive to light.   Pulmonary:      Effort: Pulmonary effort is normal.   Musculoskeletal:      Cervical back: Normal range of motion.   Neurological:      Mental Status: She is alert and oriented to person, place, and time. Mental status is at baseline.   Psychiatric:         Mood and Affect: Mood normal.         Behavior: Behavior normal.         Thought Content: Thought content normal.         Judgment: Judgment normal.     Drug/Vaccine allergy testing was performed on Tiffany Hewitt using standard " technique. There were no immediate complications.    Test Results  Controls  Positive Histamine:  (Intradermal histmamine 10X10)  Intradermal Saline: 0  Comments  Comments: Negative skin test needs oral challenge  ANTIGEN 1  Drug/Vaccine Name: Penicillin  Concentration/Solution: 36030 U  Result: Negative    Challenge testing was performed on Tiffany Hewitt using standard technique. There were no immediate complications.    Test Results  Challenge  Type of Challenge: Amoxicillin  Goal Dose: 500 mg  Vital Signs Reviewed Prior to Challenge: Yes  Assessment:  (bp 133/77 p 68 o2 98)  1)  Time: 1035  Dose: 500 mg  Reaction / Comment:  (bp 128/74 p 69 o2 97)  2)  Time: 1149  Reaction / Comment: discharge  Result  Pass: Yes  Reacted: No  Discharge Comments: stable    Skin testing and oral challenge to penicillin are negative.    Assessment/Plan     Penicillin adverse reaction  She had urticaria after having penicillin as a child but does not recall the details.    Skin testing and oral challenge to penicillin/amoxicillin are negative.    She will let her pharmacist know she may safely take penicillin/amoxicillin products.     Chronic infection  She had pneumonia 5 or 6  years ago and again on 7-11-24.  She had an empyema with the second episode.  She also endorses recurrent ear and sinus infections.  She had a Pneumovax in 2015.     She will have blood work to evaluate immune system.    By signing my name below, I, Rex Camejo, attest that this documentation has been prepared under the direction and in the presence of Laura Balbuena MD.  All medical record entries made by the Scribe were at my direction and personally dictated by me. I have reviewed the chart and agree that the record accurately reflects my personal performance of the history, physical exam, discussion and plan.

## 2024-11-05 ENCOUNTER — APPOINTMENT (OUTPATIENT)
Dept: ALLERGY | Facility: CLINIC | Age: 54
End: 2024-11-05
Payer: COMMERCIAL

## 2024-11-05 ENCOUNTER — LAB (OUTPATIENT)
Dept: LAB | Facility: LAB | Age: 54
End: 2024-11-05
Payer: COMMERCIAL

## 2024-11-05 VITALS
HEART RATE: 82 BPM | BODY MASS INDEX: 38.44 KG/M2 | SYSTOLIC BLOOD PRESSURE: 125 MMHG | DIASTOLIC BLOOD PRESSURE: 80 MMHG | OXYGEN SATURATION: 95 % | HEIGHT: 65 IN

## 2024-11-05 DIAGNOSIS — T36.0X5A ADVERSE EFFECT OF PENICILLIN, INITIAL ENCOUNTER: Primary | ICD-10-CM

## 2024-11-05 DIAGNOSIS — B99.9 CHRONIC INFECTION: ICD-10-CM

## 2024-11-05 PROBLEM — J30.2 SEASONAL ALLERGIES: Status: RESOLVED | Noted: 2023-02-14 | Resolved: 2024-11-05

## 2024-11-05 PROCEDURE — 82785 ASSAY OF IGE: CPT

## 2024-11-05 PROCEDURE — 86317 IMMUNOASSAY INFECTIOUS AGENT: CPT

## 2024-11-05 PROCEDURE — 86003 ALLG SPEC IGE CRUDE XTRC EA: CPT

## 2024-11-05 PROCEDURE — 83520 IMMUNOASSAY QUANT NOS NONAB: CPT

## 2024-11-05 PROCEDURE — 36415 COLL VENOUS BLD VENIPUNCTURE: CPT

## 2024-11-05 NOTE — ASSESSMENT & PLAN NOTE
She had pneumonia 5 or 6  years ago and again on 7-11-24.  She had an empyema with the second episode.  She also endorses recurrent ear and sinus infections.  She had a Pneumovax in 2015.     She will have blood work to evaluate immune system.

## 2024-11-05 NOTE — ASSESSMENT & PLAN NOTE
She had urticaria after having penicillin as a child but does not recall the details.    Skin testing and oral challenge to penicillin/amoxicillin are negative.    She will let her pharmacist know she may safely take penicillin/amoxicillin products.

## 2024-11-05 NOTE — PATIENT INSTRUCTIONS
Skin testing and oral challenge to penicillin/amoxicillin are negative.    Let your pharmacist know you may safely take penicillin/amoxicillin products.    Complete blood work to evaluate immune system.  I will follow up with you with results and further recommendations.     Web site primaryimmune.org is a great reference for immune problems.

## 2024-11-06 DIAGNOSIS — B99.9 CHRONIC INFECTION: Primary | ICD-10-CM

## 2024-11-06 LAB — TOTAL IGE SMQN RAST: 331 KU/L

## 2024-11-08 LAB
C DIPHTHERIAE IGG SER-ACNC: 2 IU/ML
C TETANI TOXOID IGG SERPL IA-ACNC: 4.7 IU/ML
S PN DA SERO 19F IGG SER-MCNC: 2.27 UG/ML
S PNEUM DA 1 IGG SER-MCNC: 1.63 UG/ML
S PNEUM DA 10A IGG SER-MCNC: 3.34 UG/ML
S PNEUM DA 11A IGG SER-MCNC: 0.27 UG/ML
S PNEUM DA 12F IGG SER-MCNC: 0.11 UG/ML
S PNEUM DA 14 IGG SER-MCNC: 0.11 UG/ML
S PNEUM DA 15B IGG SER-MCNC: 0.48 UG/ML
S PNEUM DA 17F IGG SER-MCNC: 9.4 UG/ML
S PNEUM DA 18C IGG SER-MCNC: 1.02 UG/ML
S PNEUM DA 19A IGG SER-MCNC: 1.32 UG/ML
S PNEUM DA 2 IGG SER-MCNC: 15.25 UG/ML
S PNEUM DA 20A IGG SER-MCNC: 0.88 UG/ML
S PNEUM DA 22F IGG SER-MCNC: 0.11 UG/ML
S PNEUM DA 23F IGG SER-MCNC: 0.13 UG/ML
S PNEUM DA 3 IGG SER-MCNC: 0.39 UG/ML
S PNEUM DA 33F IGG SER-MCNC: 1.71 UG/ML
S PNEUM DA 4 IGG SER-MCNC: 0.27 UG/ML
S PNEUM DA 5 IGG SER-MCNC: 5.3 UG/ML
S PNEUM DA 6B IGG SER-MCNC: 0.18 UG/ML
S PNEUM DA 7F IGG SER-MCNC: 1.25 UG/ML
S PNEUM DA 8 IGG SER-MCNC: 0.25 UG/ML
S PNEUM DA 9N IGG SER-MCNC: 1.64 UG/ML
S PNEUM DA 9V IGG SER-MCNC: 0.75 UG/ML
S PNEUM SEROTYPE IGG SER-IMP: NORMAL

## 2024-11-10 DIAGNOSIS — B99.9 CHRONIC INFECTION: Primary | ICD-10-CM

## 2024-11-12 LAB
A ALTERNATA IGE QN: <0.1 KU/L
A FUMIGATUS IGE QN: <0.1 KU/L
BERMUDA GRASS IGE QN: <0.1 KU/L
BOXELDER IGE QN: <0.1 KU/L
C HERBARUM IGE QN: <0.1 KU/L
CALIF WALNUT POLN IGE QN: <0.1 KU/L
CAT DANDER IGE QN: <0.1 KU/L
CMN PIGWEED IGE QN: <0.1 KU/L
COMMON RAGWEED IGE QN: <0.1 KU/L
COTTONWOOD IGE QN: <0.1 KU/L
D FARINAE IGE QN: 0.28 KU/L
D PTERONYSS IGE QN: 0.31 KU/L
DOG DANDER IGE QN: <0.1 KU/L
ENGL PLANTAIN IGE QN: <0.1 KU/L
GOOSEFOOT IGE QN: <0.1 KU/L
JOHNSON GRASS IGE QN: <0.1 KU/L
KENT BLUE GRASS IGE QN: <0.1 KU/L
LONDON PLANE IGE QN: <0.1 KU/L
MT JUNIPER IGE QN: <0.1 KU/L
P NOTATUM IGE QN: <0.1 KU/L
PECAN/HICK TREE IGE QN: <0.1 KU/L
ROACH IGE QN: 0.22 KU/L
SALTWORT IGE QN: <0.1 KU/L
SHEEP SORREL IGE QN: <0.1 KU/L
SILVER BIRCH IGE QN: <0.1 KU/L
TIMOTHY IGE QN: <0.1 KU/L
TOTAL IGE SMQN RAST: 331 KU/L
WHITE ASH IGE QN: <0.1 KU/L
WHITE ELM IGE QN: <0.1 KU/L
WHITE MULBERRY IGE QN: <0.1 KU/L
WHITE OAK IGE QN: <0.1 KU/L

## 2024-11-14 LAB — MANNOSE-BP SER-MCNC: <40 NG/ML

## 2024-11-15 ENCOUNTER — CLINICAL SUPPORT (OUTPATIENT)
Dept: ALLERGY | Facility: CLINIC | Age: 54
End: 2024-11-15
Payer: COMMERCIAL

## 2024-11-15 DIAGNOSIS — B99.9 CHRONIC INFECTION: Primary | ICD-10-CM

## 2024-11-15 PROCEDURE — 90732 PPSV23 VACC 2 YRS+ SUBQ/IM: CPT | Performed by: ALLERGY & IMMUNOLOGY

## 2024-11-15 PROCEDURE — 90471 IMMUNIZATION ADMIN: CPT | Performed by: ALLERGY & IMMUNOLOGY

## 2024-11-22 ENCOUNTER — APPOINTMENT (OUTPATIENT)
Dept: PRIMARY CARE | Facility: CLINIC | Age: 54
End: 2024-11-22
Payer: COMMERCIAL

## 2024-11-22 VITALS
RESPIRATION RATE: 16 BRPM | WEIGHT: 243 LBS | HEIGHT: 65 IN | BODY MASS INDEX: 40.48 KG/M2 | TEMPERATURE: 98 F | DIASTOLIC BLOOD PRESSURE: 80 MMHG | SYSTOLIC BLOOD PRESSURE: 119 MMHG | HEART RATE: 88 BPM

## 2024-11-22 DIAGNOSIS — E66.813 CLASS 3 SEVERE OBESITY DUE TO EXCESS CALORIES WITHOUT SERIOUS COMORBIDITY WITH BODY MASS INDEX (BMI) OF 40.0 TO 44.9 IN ADULT: ICD-10-CM

## 2024-11-22 DIAGNOSIS — J18.9 PNEUMONIA DUE TO INFECTIOUS ORGANISM, UNSPECIFIED LATERALITY, UNSPECIFIED PART OF LUNG: Primary | ICD-10-CM

## 2024-11-22 DIAGNOSIS — D89.89 MANNOSE-BINDING LECTIN DEFICIENCY (MULTI): ICD-10-CM

## 2024-11-22 DIAGNOSIS — E66.01 CLASS 3 SEVERE OBESITY DUE TO EXCESS CALORIES WITHOUT SERIOUS COMORBIDITY WITH BODY MASS INDEX (BMI) OF 40.0 TO 44.9 IN ADULT: ICD-10-CM

## 2024-11-22 PROBLEM — T36.0X5A PENICILLIN ADVERSE REACTION: Status: RESOLVED | Noted: 2024-11-05 | Resolved: 2024-11-22

## 2024-11-22 PROCEDURE — 3008F BODY MASS INDEX DOCD: CPT | Performed by: FAMILY MEDICINE

## 2024-11-22 PROCEDURE — 99214 OFFICE O/P EST MOD 30 MIN: CPT | Performed by: FAMILY MEDICINE

## 2024-11-22 PROCEDURE — 1036F TOBACCO NON-USER: CPT | Performed by: FAMILY MEDICINE

## 2024-11-22 RX ORDER — TIRZEPATIDE 2.5 MG/.5ML
2.5 INJECTION, SOLUTION SUBCUTANEOUS WEEKLY
Qty: 2 ML | Refills: 0 | Status: SHIPPED | OUTPATIENT
Start: 2024-11-22

## 2024-11-22 ASSESSMENT — ENCOUNTER SYMPTOMS: ABDOMINAL PAIN: 1

## 2024-11-22 NOTE — ASSESSMENT & PLAN NOTE
She is struggled for many years with obesity and is currently with a body mass index of 40.  She has been is trying hard with diet and exercise and not making any progress.  We discussed the pros and cons of medical intervention and the mechanism of action GLP-1 treatment.  I showed her pictures and described the way of giving an injection and she is amenable to giving this a try.  We will start her with Mounjaro weekly and follow-up in 3 months.  I told her that she should message me at the end of the month and if she is not having any side effects we will slowly titrate her dose up  Orders:    tirzepatide (Mounjaro) 2.5 mg/0.5 mL pen injector; Inject 2.5 mg under the skin 1 (one) time per week.    Follow Up In [facility] - PCP; Future

## 2024-11-22 NOTE — ASSESSMENT & PLAN NOTE
Clinically she has recovered although she has some aching pain in the right upper quadrant of her abdomen which is probably residual to the scarring seen on her CT scan.  I reviewed the results in detail with her today and they are recommending a 6-month follow-up CT scan to ensure complete resolution of the findings.  This order has been placed on her chart  Orders:    CT lung screening low dose; Future

## 2024-11-22 NOTE — PROGRESS NOTES
"Tenzin Hewitt \"Tiffany\" is a 54 y.o. female who presents for Obesity and Abdominal Pain.     Abdominal Pain  This is a new problem. Episode onset: about 1 month ago. The problem occurs intermittently. The problem has been gradually worsening. The pain is located in the LUQ. The quality of the pain is dull. The abdominal pain does not radiate.            She would also like to discuss weight loss medications.    Visit Vitals  /80   Pulse 88   Temp 36.7 °C (98 °F)   Resp 16      Objective   Physical Exam       Assessment & Plan  Pneumonia due to infectious organism, unspecified laterality, unspecified part of lung  Clinically she has recovered although she has some aching pain in the right upper quadrant of her abdomen which is probably residual to the scarring seen on her CT scan.  I reviewed the results in detail with her today and they are recommending a 6-month follow-up CT scan to ensure complete resolution of the findings.  This order has been placed on her chart  Orders:    CT lung screening low dose; Future    Mannose-binding lectin deficiency (Multi)  Since her last visit she had a visit with allergy immunology and found out that she is not actually allergic to penicillin.  This has been removed from her allergy list.  She was diagnosed with his lectin deficiency which makes it more likely More severe responses to bacterial infection.  This diagnosis has been added to her last       Class 3 severe obesity due to excess calories without serious comorbidity with body mass index (BMI) of 40.0 to 44.9 in adult  She is struggled for many years with obesity and is currently with a body mass index of 40.  She has been is trying hard with diet and exercise and not making any progress.  We discussed the pros and cons of medical intervention and the mechanism of action GLP-1 treatment.  I showed her pictures and described the way of giving an injection and she is amenable to giving this a try.  We " will start her with Mounjaro weekly and follow-up in 3 months.  I told her that she should message me at the end of the month and if she is not having any side effects we will slowly titrate her dose up  Orders:    tirzepatide (Mounjaro) 2.5 mg/0.5 mL pen injector; Inject 2.5 mg under the skin 1 (one) time per week.    Follow Up In [facility] - PCP; Future           Please excuse any errors in grammar or translation related to this dictation. Voice recognition software was utilized to prepare this document.

## 2024-11-22 NOTE — ASSESSMENT & PLAN NOTE
Since her last visit she had a visit with allergy immunology and found out that she is not actually allergic to penicillin.  This has been removed from her allergy list.  She was diagnosed with his lectin deficiency which makes it more likely More severe responses to bacterial infection.  This diagnosis has been added to her last

## 2024-11-25 ENCOUNTER — APPOINTMENT (OUTPATIENT)
Dept: PRIMARY CARE | Facility: CLINIC | Age: 54
End: 2024-11-25
Payer: COMMERCIAL

## 2024-12-20 ENCOUNTER — LAB (OUTPATIENT)
Dept: LAB | Facility: LAB | Age: 54
End: 2024-12-20
Payer: COMMERCIAL

## 2024-12-20 DIAGNOSIS — B99.9 CHRONIC INFECTION: ICD-10-CM

## 2024-12-20 PROCEDURE — 36415 COLL VENOUS BLD VENIPUNCTURE: CPT

## 2024-12-20 PROCEDURE — 86317 IMMUNOASSAY INFECTIOUS AGENT: CPT

## 2024-12-23 LAB
S PN DA SERO 19F IGG SER-MCNC: 27.6 UG/ML
S PNEUM DA 1 IGG SER-MCNC: 18.59 UG/ML
S PNEUM DA 10A IGG SER-MCNC: >29.06 UG/ML
S PNEUM DA 11A IGG SER-MCNC: >3.45 UG/ML
S PNEUM DA 12F IGG SER-MCNC: 0.33 UG/ML
S PNEUM DA 14 IGG SER-MCNC: 0.06 UG/ML
S PNEUM DA 15B IGG SER-MCNC: 10.03 UG/ML
S PNEUM DA 17F IGG SER-MCNC: >11.68 UG/ML
S PNEUM DA 18C IGG SER-MCNC: >11.15 UG/ML
S PNEUM DA 19A IGG SER-MCNC: 1.57 UG/ML
S PNEUM DA 2 IGG SER-MCNC: >27.3 UG/ML
S PNEUM DA 20A IGG SER-MCNC: 5.93 UG/ML
S PNEUM DA 22F IGG SER-MCNC: 0.39 UG/ML
S PNEUM DA 23F IGG SER-MCNC: 2.49 UG/ML
S PNEUM DA 3 IGG SER-MCNC: 0.47 UG/ML
S PNEUM DA 33F IGG SER-MCNC: 9.66 UG/ML
S PNEUM DA 4 IGG SER-MCNC: 2.9 UG/ML
S PNEUM DA 5 IGG SER-MCNC: >21.21 UG/ML
S PNEUM DA 6B IGG SER-MCNC: 0.25 UG/ML
S PNEUM DA 7F IGG SER-MCNC: 5.83 UG/ML
S PNEUM DA 8 IGG SER-MCNC: 2.9 UG/ML
S PNEUM DA 9N IGG SER-MCNC: >11.04 UG/ML
S PNEUM DA 9V IGG SER-MCNC: 8.75 UG/ML
S PNEUM SEROTYPE IGG SER-IMP: NORMAL

## 2025-01-18 ASSESSMENT — ENCOUNTER SYMPTOMS
SORE THROAT: 1
SHORTNESS OF BREATH: 0
HEADACHES: 1
TROUBLE SWALLOWING: 1
DIARRHEA: 0
ABDOMINAL PAIN: 0
HOARSE VOICE: 0
SWOLLEN GLANDS: 1
NECK PAIN: 0
COUGH: 0
STRIDOR: 0

## 2025-01-20 ENCOUNTER — APPOINTMENT (OUTPATIENT)
Dept: PRIMARY CARE | Facility: CLINIC | Age: 55
End: 2025-01-20
Payer: COMMERCIAL

## 2025-01-20 VITALS
RESPIRATION RATE: 18 BRPM | HEIGHT: 65 IN | BODY MASS INDEX: 40.48 KG/M2 | TEMPERATURE: 98.8 F | HEART RATE: 84 BPM | SYSTOLIC BLOOD PRESSURE: 122 MMHG | DIASTOLIC BLOOD PRESSURE: 82 MMHG | WEIGHT: 243 LBS

## 2025-01-20 DIAGNOSIS — D89.89 MANNOSE-BINDING LECTIN DEFICIENCY (MULTI): ICD-10-CM

## 2025-01-20 DIAGNOSIS — J02.9 PHARYNGITIS, UNSPECIFIED ETIOLOGY: Primary | ICD-10-CM

## 2025-01-20 DIAGNOSIS — J02.9 SORE THROAT: ICD-10-CM

## 2025-01-20 LAB — POC RAPID STREP: NEGATIVE

## 2025-01-20 PROCEDURE — 1036F TOBACCO NON-USER: CPT | Performed by: FAMILY MEDICINE

## 2025-01-20 PROCEDURE — 87880 STREP A ASSAY W/OPTIC: CPT | Performed by: FAMILY MEDICINE

## 2025-01-20 PROCEDURE — 3008F BODY MASS INDEX DOCD: CPT | Performed by: FAMILY MEDICINE

## 2025-01-20 PROCEDURE — 99212 OFFICE O/P EST SF 10 MIN: CPT | Performed by: FAMILY MEDICINE

## 2025-01-20 ASSESSMENT — ENCOUNTER SYMPTOMS
TROUBLE SWALLOWING: 1
SWOLLEN GLANDS: 1
HOARSE VOICE: 0
DIARRHEA: 0
HEADACHES: 1
SORE THROAT: 1
STRIDOR: 0
SHORTNESS OF BREATH: 0
COUGH: 0
NECK PAIN: 0
ABDOMINAL PAIN: 0

## 2025-01-20 NOTE — PROGRESS NOTES
"Tenzin Hewitt \"Tiffany\" is a 54 y.o. female who presents for URI.     Sore Throat   This is a new problem. The current episode started yesterday. The problem has been gradually worsening. The pain is worse on the right side. There has been no fever. The pain is at a severity of 4/10. The pain is moderate. Associated symptoms include ear pain, headaches, swollen glands and trouble swallowing. Pertinent negatives include no abdominal pain, congestion, coughing, diarrhea, drooling, ear discharge, hoarse voice, plugged ear sensation, neck pain, shortness of breath or stridor.            Visit Vitals  /82   Pulse 84   Temp 37.1 °C (98.8 °F)   Resp 18      Objective   Physical Exam  Constitutional:       Appearance: Normal appearance.   HENT:      Head: Normocephalic.      Right Ear: Tympanic membrane, ear canal and external ear normal.      Left Ear: Tympanic membrane, ear canal and external ear normal.      Ears:      Comments: TMs appear to be retracted but there is no dullness or fluid level or erythema     Nose: Nose normal.      Mouth/Throat:      Mouth: Mucous membranes are moist.   Eyes:      Conjunctiva/sclera: Conjunctivae normal.   Cardiovascular:      Rate and Rhythm: Normal rate and regular rhythm.   Pulmonary:      Effort: Pulmonary effort is normal.      Breath sounds: Normal breath sounds.   Lymphadenopathy:      Comments: There is some submandibular lymphadenopathy bilateral   Skin:     General: Skin is warm.      Findings: No rash.   Neurological:      Mental Status: She is alert.   Psychiatric:         Mood and Affect: Mood normal.     No data recorded     No data recorded   Assessment & Plan  Pharyngitis, unspecified etiology  This 54-year-old female has a 3-day history of a sore throat with some mild upper respiratory symptoms.  Her physical exam reveals no evidence of otitis media, bronchitis, pneumonia, or sinus infection.  Her rapid strep test is negative making this likely a " viral pharyngitis.  We will treat conservatively with symptom control medications and if this worsens or is not resolved by the end of the week would consider treating empirically for bacterial infection.       Sore throat    Orders:    POCT rapid strep A manually resulted    Mannose-binding lectin deficiency (Multi)                Please excuse any errors in grammar or translation related to this dictation. Voice recognition software was utilized to prepare this document.

## 2025-02-19 ENCOUNTER — OFFICE VISIT (OUTPATIENT)
Dept: PRIMARY CARE | Facility: CLINIC | Age: 55
End: 2025-02-19
Payer: COMMERCIAL

## 2025-02-19 VITALS
RESPIRATION RATE: 18 BRPM | DIASTOLIC BLOOD PRESSURE: 81 MMHG | TEMPERATURE: 98 F | HEIGHT: 65 IN | HEART RATE: 94 BPM | WEIGHT: 244 LBS | BODY MASS INDEX: 40.65 KG/M2 | SYSTOLIC BLOOD PRESSURE: 124 MMHG

## 2025-02-19 DIAGNOSIS — J02.9 SORE THROAT: ICD-10-CM

## 2025-02-19 DIAGNOSIS — J20.9 ACUTE BRONCHITIS, UNSPECIFIED ORGANISM: Primary | ICD-10-CM

## 2025-02-19 LAB
POC RAPID INFLUENZA A: NEGATIVE
POC RAPID INFLUENZA B: NEGATIVE
POC RAPID STREP: NEGATIVE
POC SARS-COV-2 AG BINAX: NORMAL

## 2025-02-19 PROCEDURE — 87811 SARS-COV-2 COVID19 W/OPTIC: CPT | Performed by: FAMILY MEDICINE

## 2025-02-19 PROCEDURE — 87880 STREP A ASSAY W/OPTIC: CPT | Performed by: FAMILY MEDICINE

## 2025-02-19 PROCEDURE — 3008F BODY MASS INDEX DOCD: CPT | Performed by: FAMILY MEDICINE

## 2025-02-19 PROCEDURE — 87804 INFLUENZA ASSAY W/OPTIC: CPT | Performed by: FAMILY MEDICINE

## 2025-02-19 PROCEDURE — 1036F TOBACCO NON-USER: CPT | Performed by: FAMILY MEDICINE

## 2025-02-19 PROCEDURE — 99214 OFFICE O/P EST MOD 30 MIN: CPT | Performed by: FAMILY MEDICINE

## 2025-02-19 RX ORDER — AZITHROMYCIN 250 MG/1
TABLET, FILM COATED ORAL
Qty: 6 TABLET | Refills: 0 | Status: SHIPPED | OUTPATIENT
Start: 2025-02-19 | End: 2025-02-24

## 2025-02-19 ASSESSMENT — ENCOUNTER SYMPTOMS
TROUBLE SWALLOWING: 1
HEADACHES: 1
STRIDOR: 1
COUGH: 1
SORE THROAT: 1

## 2025-02-19 NOTE — PROGRESS NOTES
"Tenzin Hewitt \"Tiffany\" is a 54 y.o. female who presents for URI.     Sore Throat   This is a new problem. The current episode started in the past 7 days. The problem has been rapidly worsening. The pain is worse on the left side. There has been no fever. The pain is at a severity of 6/10. The pain is moderate. Associated symptoms include coughing, ear pain, headaches, a plugged ear sensation, stridor and trouble swallowing.            Visit Vitals  /81   Pulse 94   Temp 36.7 °C (98 °F)   Resp 18      Objective   Physical Exam  Constitutional:       Appearance: Normal appearance.   HENT:      Head: Normocephalic.      Right Ear: Tympanic membrane, ear canal and external ear normal.      Left Ear: Tympanic membrane, ear canal and external ear normal.      Nose: Nose normal.      Mouth/Throat:      Mouth: Mucous membranes are moist.   Eyes:      Conjunctiva/sclera: Conjunctivae normal.   Cardiovascular:      Rate and Rhythm: Normal rate and regular rhythm.   Pulmonary:      Effort: Pulmonary effort is normal.      Breath sounds: Rhonchi (scattered) present. No wheezing.   Skin:     General: Skin is warm.      Findings: No rash.   Neurological:      Mental Status: She is alert.   Psychiatric:         Mood and Affect: Mood normal.       No data recorded     No data recorded   No data recorded   Assessment & Plan  Acute bronchitis, unspecified organism  This 54-year-old female has an 8-day history of upper respiratory symptoms with a heavy cough and sore throat that is actually worsening.  On examination there is no evidence of pharyngitis, otitis, or pneumonia.  Her rapid swabs are negative for viral infection and strep throat.  I suspect this is a bacterial or atypical bronchitis.  We will cover her with a macrolide antibiotic.  I recommended honey for cough suppression, rest and increase fluids until she is better.  Orders:    azithromycin (Zithromax) 250 mg tablet; Take 2 tabs (500 mg) by mouth " today, then 1 daily for 4 more days.           Please excuse any errors in grammar or translation related to this dictation. Voice recognition software was utilized to prepare this document.

## 2025-03-13 ENCOUNTER — APPOINTMENT (OUTPATIENT)
Dept: PRIMARY CARE | Facility: CLINIC | Age: 55
End: 2025-03-13
Payer: COMMERCIAL

## 2025-03-31 ENCOUNTER — APPOINTMENT (OUTPATIENT)
Dept: PRIMARY CARE | Facility: CLINIC | Age: 55
End: 2025-03-31
Payer: COMMERCIAL

## 2025-03-31 VITALS
HEIGHT: 65 IN | BODY MASS INDEX: 39.99 KG/M2 | DIASTOLIC BLOOD PRESSURE: 77 MMHG | RESPIRATION RATE: 16 BRPM | HEART RATE: 85 BPM | SYSTOLIC BLOOD PRESSURE: 116 MMHG | TEMPERATURE: 97.5 F | WEIGHT: 240 LBS

## 2025-03-31 DIAGNOSIS — Z00.00 WELL ADULT EXAM: Primary | ICD-10-CM

## 2025-03-31 DIAGNOSIS — B02.9 HERPES ZOSTER WITHOUT COMPLICATION: ICD-10-CM

## 2025-03-31 DIAGNOSIS — Z12.31 ENCOUNTER FOR SCREENING MAMMOGRAM FOR MALIGNANT NEOPLASM OF BREAST: ICD-10-CM

## 2025-03-31 DIAGNOSIS — R91.8 MULTIPLE PULMONARY NODULES: ICD-10-CM

## 2025-03-31 DIAGNOSIS — Z00.00 ROUTINE GENERAL MEDICAL EXAMINATION AT A HEALTH CARE FACILITY: ICD-10-CM

## 2025-03-31 DIAGNOSIS — Z13.220 SCREENING, LIPID: ICD-10-CM

## 2025-03-31 PROCEDURE — 3008F BODY MASS INDEX DOCD: CPT | Performed by: FAMILY MEDICINE

## 2025-03-31 PROCEDURE — 1036F TOBACCO NON-USER: CPT | Performed by: FAMILY MEDICINE

## 2025-03-31 PROCEDURE — 99396 PREV VISIT EST AGE 40-64: CPT | Performed by: FAMILY MEDICINE

## 2025-03-31 RX ORDER — CEFDINIR 300 MG/1
300 CAPSULE ORAL 2 TIMES DAILY
COMMUNITY
Start: 2025-03-29

## 2025-03-31 RX ORDER — PREDNISONE 20 MG/1
20 TABLET ORAL SEE ADMIN INSTRUCTIONS
COMMUNITY
Start: 2025-03-26

## 2025-03-31 RX ORDER — ACYCLOVIR 800 MG/1
800 TABLET ORAL
COMMUNITY
Start: 2025-03-29

## 2025-03-31 NOTE — PROGRESS NOTES
"Tenzin Hewitt \"Tiffany\" is a 54 y.o. female who presents for a wellness visit.  HPI    Review of Systems  Hearing/Vision screen:No results found.   Visit Vitals  /77   Pulse 85   Temp 36.4 °C (97.5 °F)   Resp 16      Objective   Physical Exam  Constitutional:       Appearance: Normal appearance.   HENT:      Head: Normocephalic.   Eyes:      Conjunctiva/sclera: Conjunctivae normal.   Cardiovascular:      Rate and Rhythm: Normal rate and regular rhythm.      Heart sounds: Normal heart sounds.   Pulmonary:      Effort: Pulmonary effort is normal.      Breath sounds: Normal breath sounds.   Musculoskeletal:      Cervical back: Neck supple.   Skin:     General: Skin is warm and dry.   Neurological:      Mental Status: She is alert.         Assessment & Plan  Well adult exam  This 54-year-old female is working on a \"clean\" diet that she is eating healthy and exercising.  She has lost weight and dropped her body mass index below 40.  I encouraged her to continue on this program as it is clearly making a difference.  Will get her routine screening blood work and add a lipid profile to see how much her diet has changed this.  However, her ASCVD risk score is already quite good at 1.5% based on last readings.      Orders:    Comprehensive Metabolic Panel; Future    CBC; Future    Follow Up In [facility] - PCP; Future    Routine general medical examination at a health care facility    Orders:    Follow Up In [facility] - PCP - Health Maintenance    Encounter for screening mammogram for malignant neoplasm of breast    Orders:    BI mammo bilateral screening tomosynthesis; Future    Screening, lipid    Orders:    Lipid Panel; Future    Herpes zoster without complication  She does have a case of shingles in the left forehead region that is not involving the eye or the TRISTAN orbital area.  She is already utilizing oral acyclovir to reduce the severity.  I cautioned her that she needs to " avoid contact with infants under the age of 1 and anybody with a compromised immune system like a patient taking chemotherapy.  I also advised her to get the shingles vaccine when this is completely cleared up.       Multiple pulmonary nodules  She is pursuing annual low-dose CT screening based on her smoking history.  It has been 11 years since she quit smoking.  We will continue annual screening for at least 4 more years              Please excuse any errors in grammar or translation related to this dictation. Voice recognition software was utilized to prepare this document.

## 2025-03-31 NOTE — ASSESSMENT & PLAN NOTE
She is pursuing annual low-dose CT screening based on her smoking history.  It has been 11 years since she quit smoking.  We will continue annual screening for at least 4 more years

## 2025-04-18 ENCOUNTER — HOSPITAL ENCOUNTER (OUTPATIENT)
Dept: RADIOLOGY | Facility: HOSPITAL | Age: 55
Discharge: HOME | End: 2025-04-18
Payer: COMMERCIAL

## 2025-04-18 DIAGNOSIS — J18.9 PNEUMONIA DUE TO INFECTIOUS ORGANISM, UNSPECIFIED LATERALITY, UNSPECIFIED PART OF LUNG: ICD-10-CM

## 2025-04-18 PROCEDURE — 71271 CT THORAX LUNG CANCER SCR C-: CPT

## 2025-04-19 LAB
ALBUMIN SERPL-MCNC: 4 G/DL (ref 3.6–5.1)
ALP SERPL-CCNC: 64 U/L (ref 37–153)
ALT SERPL-CCNC: 16 U/L (ref 6–29)
ANION GAP SERPL CALCULATED.4IONS-SCNC: 9 MMOL/L (CALC) (ref 7–17)
AST SERPL-CCNC: 14 U/L (ref 10–35)
BILIRUB SERPL-MCNC: 0.4 MG/DL (ref 0.2–1.2)
BUN SERPL-MCNC: 13 MG/DL (ref 7–25)
CALCIUM SERPL-MCNC: 9 MG/DL (ref 8.6–10.4)
CHLORIDE SERPL-SCNC: 110 MMOL/L (ref 98–110)
CHOLEST SERPL-MCNC: 213 MG/DL
CHOLEST/HDLC SERPL: 4.1 (CALC)
CO2 SERPL-SCNC: 23 MMOL/L (ref 20–32)
CREAT SERPL-MCNC: 0.62 MG/DL (ref 0.5–1.03)
EGFRCR SERPLBLD CKD-EPI 2021: 106 ML/MIN/1.73M2
ERYTHROCYTE [DISTWIDTH] IN BLOOD BY AUTOMATED COUNT: 13.8 % (ref 11–15)
GLUCOSE SERPL-MCNC: 110 MG/DL (ref 65–99)
HCT VFR BLD AUTO: 40.4 % (ref 35–45)
HDLC SERPL-MCNC: 52 MG/DL
HGB BLD-MCNC: 13.5 G/DL (ref 11.7–15.5)
LDLC SERPL CALC-MCNC: 134 MG/DL (CALC)
MCH RBC QN AUTO: 30 PG (ref 27–33)
MCHC RBC AUTO-ENTMCNC: 33.4 G/DL (ref 32–36)
MCV RBC AUTO: 89.8 FL (ref 80–100)
NONHDLC SERPL-MCNC: 161 MG/DL (CALC)
PLATELET # BLD AUTO: 300 THOUSAND/UL (ref 140–400)
PMV BLD REES-ECKER: 11.3 FL (ref 7.5–12.5)
POTASSIUM SERPL-SCNC: 4.2 MMOL/L (ref 3.5–5.3)
PROT SERPL-MCNC: 6.5 G/DL (ref 6.1–8.1)
RBC # BLD AUTO: 4.5 MILLION/UL (ref 3.8–5.1)
SODIUM SERPL-SCNC: 142 MMOL/L (ref 135–146)
TRIGL SERPL-MCNC: 144 MG/DL
WBC # BLD AUTO: 5.5 THOUSAND/UL (ref 3.8–10.8)

## 2025-04-21 ENCOUNTER — APPOINTMENT (OUTPATIENT)
Dept: PRIMARY CARE | Facility: CLINIC | Age: 55
End: 2025-04-21
Payer: COMMERCIAL

## 2025-04-21 VITALS
BODY MASS INDEX: 40.82 KG/M2 | TEMPERATURE: 97.8 F | HEART RATE: 84 BPM | SYSTOLIC BLOOD PRESSURE: 138 MMHG | HEIGHT: 65 IN | DIASTOLIC BLOOD PRESSURE: 81 MMHG | WEIGHT: 245 LBS | RESPIRATION RATE: 16 BRPM

## 2025-04-21 DIAGNOSIS — R73.9 HYPERGLYCEMIA: ICD-10-CM

## 2025-04-21 DIAGNOSIS — E66.01 CLASS 3 SEVERE OBESITY DUE TO EXCESS CALORIES WITHOUT SERIOUS COMORBIDITY WITH BODY MASS INDEX (BMI) OF 40.0 TO 44.9 IN ADULT: ICD-10-CM

## 2025-04-21 DIAGNOSIS — R91.8 MULTIPLE PULMONARY NODULES: Primary | ICD-10-CM

## 2025-04-21 DIAGNOSIS — R73.9 HYPERGLYCEMIA: Primary | ICD-10-CM

## 2025-04-21 DIAGNOSIS — R73.03 PREDIABETES: ICD-10-CM

## 2025-04-21 DIAGNOSIS — E66.813 CLASS 3 SEVERE OBESITY DUE TO EXCESS CALORIES WITHOUT SERIOUS COMORBIDITY WITH BODY MASS INDEX (BMI) OF 40.0 TO 44.9 IN ADULT: ICD-10-CM

## 2025-04-21 LAB — POC HEMOGLOBIN A1C: 5.9 % (ref 4.2–6.5)

## 2025-04-21 PROCEDURE — 3008F BODY MASS INDEX DOCD: CPT | Performed by: FAMILY MEDICINE

## 2025-04-21 PROCEDURE — 99213 OFFICE O/P EST LOW 20 MIN: CPT | Performed by: FAMILY MEDICINE

## 2025-04-21 PROCEDURE — 83036 HEMOGLOBIN GLYCOSYLATED A1C: CPT | Performed by: FAMILY MEDICINE

## 2025-04-21 PROCEDURE — 1036F TOBACCO NON-USER: CPT | Performed by: FAMILY MEDICINE

## 2025-04-21 NOTE — ASSESSMENT & PLAN NOTE
Lab Results   Component Value Date    HGBA1C 5.9 04/21/2025     Screening blood work revealed a mildly elevated blood glucose so we performed an A1c today which is again mildly elevated but not in the diabetes range.  We discussed about the idea of prediabetes putting her at significantly high risk for developing diabetes in the future.  We will certainly monitor this at least once a year going forward.  We stressed the importance of maintaining or losing weight and following a lower carbohydrate diet to prevent the onset of diabetes

## 2025-04-21 NOTE — ASSESSMENT & PLAN NOTE
This 54-year-old female has been following an annual low-dose lung CT screening protocol due to her history of smoking.  She has not smoked for the last 11 years.  She has had several recurrent right lower lobe pneumonias in the past and as a consequence has chronic stable scarring in the right lower hemithorax.  The radiologist put in a very thorough commentary on this.  We reviewed the images together in the office so that she could understand what the radiologist was talking about.  Essentially she has some chronic stable scarring which is most likely leftover from the previous pneumonias.  She has stable nodules which have not changed in size.  There is no evidence of a solid tumor or lesion in the lung tissue on either side.  I given the option of continuing annual low-dose CT screening or considering a surgical consultation and potential biopsy.  Through shared decision making she is comfortable continuing the annual CT screening which I agree is appropriate.

## 2025-04-21 NOTE — PROGRESS NOTES
"Tenzin Hewitt \"Tiffany\" is a 54 y.o. female who presents for Results (Lab tests and CT scan).     HPI         Visit Vitals  /81   Pulse 84   Temp 36.6 °C (97.8 °F)   Resp 16      Objective   Physical Exam  Constitutional:       Appearance: Normal appearance.   HENT:      Head: Normocephalic.   Pulmonary:      Effort: Pulmonary effort is normal.   Musculoskeletal:      Cervical back: Neck supple.   Skin:     General: Skin is warm and dry.   Psychiatric:         Mood and Affect: Mood normal.       No data recorded     No data recorded   No data recorded   Assessment & Plan  Multiple pulmonary nodules  This 54-year-old female has been following an annual low-dose lung CT screening protocol due to her history of smoking.  She has not smoked for the last 11 years.  She has had several recurrent right lower lobe pneumonias in the past and as a consequence has chronic stable scarring in the right lower hemithorax.  The radiologist put in a very thorough commentary on this.  We reviewed the images together in the office so that she could understand what the radiologist was talking about.  Essentially she has some chronic stable scarring which is most likely leftover from the previous pneumonias.  She has stable nodules which have not changed in size.  There is no evidence of a solid tumor or lesion in the lung tissue on either side.  I given the option of continuing annual low-dose CT screening or considering a surgical consultation and potential biopsy.  Through shared decision making she is comfortable continuing the annual CT screening which I agree is appropriate.       Prediabetes  Lab Results   Component Value Date    HGBA1C 5.9 04/21/2025     Screening blood work revealed a mildly elevated blood glucose so we performed an A1c today which is again mildly elevated but not in the diabetes range.  We discussed about the idea of prediabetes putting her at significantly high risk for developing diabetes " in the future.  We will certainly monitor this at least once a year going forward.  We stressed the importance of maintaining or losing weight and following a lower carbohydrate diet to prevent the onset of diabetes       Class 3 severe obesity due to excess calories without serious comorbidity with body mass index (BMI) of 40.0 to 44.9 in adult    Orders:    Follow Up In [facility] - PCP           Please excuse any errors in grammar or translation related to this dictation. Voice recognition software was utilized to prepare this document.

## 2025-05-03 ENCOUNTER — HOSPITAL ENCOUNTER (OUTPATIENT)
Dept: RADIOLOGY | Facility: HOSPITAL | Age: 55
Discharge: HOME | End: 2025-05-03
Payer: COMMERCIAL

## 2025-05-03 VITALS — WEIGHT: 245 LBS | HEIGHT: 65 IN | BODY MASS INDEX: 40.82 KG/M2

## 2025-05-03 DIAGNOSIS — Z12.31 ENCOUNTER FOR SCREENING MAMMOGRAM FOR MALIGNANT NEOPLASM OF BREAST: ICD-10-CM

## 2025-05-03 PROCEDURE — 77063 BREAST TOMOSYNTHESIS BI: CPT | Performed by: STUDENT IN AN ORGANIZED HEALTH CARE EDUCATION/TRAINING PROGRAM

## 2025-05-03 PROCEDURE — 77067 SCR MAMMO BI INCL CAD: CPT | Performed by: STUDENT IN AN ORGANIZED HEALTH CARE EDUCATION/TRAINING PROGRAM

## 2025-05-03 PROCEDURE — 77063 BREAST TOMOSYNTHESIS BI: CPT

## 2025-05-03 PROCEDURE — 77067 SCR MAMMO BI INCL CAD: CPT

## 2025-05-14 ENCOUNTER — APPOINTMENT (OUTPATIENT)
Facility: CLINIC | Age: 55
End: 2025-05-14
Payer: COMMERCIAL

## 2025-05-14 ENCOUNTER — CLINICAL SUPPORT (OUTPATIENT)
Dept: AUDIOLOGY | Facility: CLINIC | Age: 55
End: 2025-05-14
Payer: COMMERCIAL

## 2025-05-14 VITALS
HEIGHT: 65 IN | BODY MASS INDEX: 40.82 KG/M2 | TEMPERATURE: 97.5 F | WEIGHT: 245 LBS | SYSTOLIC BLOOD PRESSURE: 118 MMHG | DIASTOLIC BLOOD PRESSURE: 79 MMHG

## 2025-05-14 DIAGNOSIS — H90.42 SENSORINEURAL HEARING LOSS (SNHL) OF LEFT EAR WITH UNRESTRICTED HEARING OF RIGHT EAR: Primary | ICD-10-CM

## 2025-05-14 DIAGNOSIS — J30.9 CHRONIC ALLERGIC RHINITIS: ICD-10-CM

## 2025-05-14 DIAGNOSIS — H69.93 DYSFUNCTION OF BOTH EUSTACHIAN TUBES: Primary | ICD-10-CM

## 2025-05-14 PROCEDURE — 99214 OFFICE O/P EST MOD 30 MIN: CPT | Performed by: OTOLARYNGOLOGY

## 2025-05-14 PROCEDURE — 1036F TOBACCO NON-USER: CPT | Performed by: OTOLARYNGOLOGY

## 2025-05-14 PROCEDURE — 92557 COMPREHENSIVE HEARING TEST: CPT

## 2025-05-14 PROCEDURE — 3008F BODY MASS INDEX DOCD: CPT | Performed by: OTOLARYNGOLOGY

## 2025-05-14 RX ORDER — FLUTICASONE PROPIONATE 50 MCG
2 SPRAY, SUSPENSION (ML) NASAL DAILY
Qty: 48 G | Refills: 3 | Status: SHIPPED | OUTPATIENT
Start: 2025-05-14 | End: 2026-05-14

## 2025-05-14 ASSESSMENT — PAIN - FUNCTIONAL ASSESSMENT: PAIN_FUNCTIONAL_ASSESSMENT: 0-10

## 2025-05-14 ASSESSMENT — PAIN SCALES - GENERAL: PAINLEVEL_OUTOF10: 0 - NO PAIN

## 2025-05-14 NOTE — PROGRESS NOTES
AUDIOLOGIC EVALUATION  Name: Nelda Hewitt  YOB: 1970  MRN: [id number]  Age: 54 y.o.    Date of Evaluation:  5/14/2025     History:  Nelda Hewitt, 54 y.o., was seen today for a hearing evaluation in a conjunction visit with Elgin Grossman DO. The patient reported a history of chronic ear infections. Her most recent ear infection ended a few weeks ago and was in both ears. She noted left otalgia when her ears are infected. She had PE tubes placed as a child. She denied current otalgia, aural fullness, tinnitus, and dizziness.    Evaluation:    Otoscopy  Clear canals bilaterally. Both eardrums appear abnormal.    Tympanometry  Right ear: Could not test - patient deferred due to ear sensitivity.  Left ear: Could not test - patient deferred due to ear sensitivity.     Acoustic Reflexes  Right ear: Could not test - patient deferred due to ear sensitivity.  Left ear: Could not test - patient deferred due to ear sensitivity.    Audiometric Evaluation  Right ear: normal hearing sensitivity. Word recognition ability estimated to be excellent(100%) at 60 dB HL based on an NU-6 recorded ordered by difficulty 10-word list.  Left ear: mild sensorineural hearing loss through 1000 Hz rising to normal hearing. Word recognition ability estimated to be excellent(100%) at 60 dB HL based on an NU-6 recorded ordered by difficulty 10-word list.    The test results were discussed with the patient and they were returned to Dr. Grossman for completion of the office visit.    Impressions  Today's evaluation revealed normal hearing in the right ear and a mild sensorineural hearing loss through 1000 Hz rising to normal hearing in the left ear. Word recognition abilities were measured to be excellent bilaterally. Tympanograms were unable to be assessed due to patient request.    All patient questions were answered.     Recommendations  - Continue medical follow-up with established providers   - Continue medical follow-up with   [facility]  - Re-test hearing annually    Time: 5261-0342    Sonia Mendosa, KORIN, CCC-A  Licensed Audiologist

## 2025-05-14 NOTE — LETTER
May 14, 2025     Kirill Ramirez MD  [address]  [facility] [address]    Patient: Tiffany Hewitt   YOB: 1970   Date of Visit: 5/14/2025       Dear Dr. Kirill Ramirez MD:    Thank you for referring Tiffany Hewitt to me for evaluation. Below are my notes for this consultation.  If you have questions, please do not hesitate to call me. I look forward to following your patient along with you.       Sincerely,     KORIN Beck, CCC-A      CC: No Recipients  ______________________________________________________________________________________    AUDIOLOGIC EVALUATION  Name: Nelda Hewitt  YOB: 1970  MRN: [id number]  Age: 54 y.o.    Date of Evaluation:  5/14/2025     History:  Nelda Hewitt, 54 y.o., was seen today for a hearing evaluation in a conjunction visit with Elgin Grossman DO. The patient reported a history of chronic ear infections. Her most recent ear infection ended a few weeks ago and was in both ears. She noted left otalgia when her ears are infected. She had PE tubes placed as a child. She denied current otalgia, aural fullness, tinnitus, and dizziness.    Evaluation:    Otoscopy  Clear canals bilaterally. Both eardrums appear abnormal.    Tympanometry  Right ear: Could not test - patient deferred due to ear sensitivity.  Left ear: Could not test - patient deferred due to ear sensitivity.     Acoustic Reflexes  Right ear: Could not test - patient deferred due to ear sensitivity.  Left ear: Could not test - patient deferred due to ear sensitivity.    Audiometric Evaluation  Right ear: normal hearing sensitivity. Word recognition ability estimated to be excellent(100%) at 60 dB HL based on an NU-6 recorded ordered by difficulty 10-word list.  Left ear: mild sensorineural hearing loss through 1000 Hz rising to normal hearing. Word recognition ability estimated to be excellent(100%) at 60 dB HL based on an NU-6 recorded ordered by difficulty 10-word  list.    The test results were discussed with the patient and they were returned to Dr. Grossman for completion of the office visit.    Impressions  Today's evaluation revealed normal hearing in the right ear and a mild sensorineural hearing loss through 1000 Hz rising to normal hearing in the left ear. Word recognition abilities were measured to be excellent bilaterally. Tympanograms were unable to be assessed due to patient request.    All patient questions were answered.     Recommendations  - Continue medical follow-up with established providers   - Continue medical follow-up with Dr. Grossman  - Re-test hearing annually    Time: 3699-4874    KORIN Beck, CCC-A  Licensed Audiologist

## 2025-05-14 NOTE — PROGRESS NOTES
Impression:              1. Dysfunction of both eustachian tubes        2. Chronic allergic rhinitis  fluticasone (Flonase) 50 mcg/actuation nasal spray           Recommendations/Plan:  I reassured the patient there is no evidence of any middle ear effusion however both tympanic membranes are retracted and she has eustachian tube dysfunction.  We will restart Flonase nasal spray-2 puffs each nostril daily for the next few months.  If she continues to have pressure problems, she may benefit from bilateral ear tube insertion.    **This electronic medical record note was created with the use of voice recognition software.  Despite proofreading, typographical or grammatical errors may be present that could affect meaning of content **    Subjective:  Tiffany returns to the office today complaining of ear pressure.  She has been seen through urgent care and she has been told that she has had back-to-back middle ear infections.  No drainage from the ears.  No other upper respiratory complaints.  In the past she was using Flonase but nothing recent.  She denies any fever or chills.  Her hearing is stable.    Objective:    Visit Vitals  /79   Temp 36.4 °C (97.5 °F) (Temporal)        Current Outpatient Medications   Medication Instructions    fluticasone (Flonase) 50 mcg/actuation nasal spray 2 sprays, Each Nostril, Daily    multivitamin tablet 1 tablet, Daily        RX Allergies[1]     Physical Exam:  Right ear-external canal is patent.  TM is retracted with decreased mobility.  No effusion.  Mastoid nontender.  Left ear-external canal is patent.  TM is retracted with decreased mobility.  No effusion.  Mastoid nontender.  Nose-clear, turbinates are mildly swollen.  No pus polyps or lesions.  Oral cavity-clear no lesions, no postnasal drip.    Results:  I reviewed her recent audiogram and she has essentially normal hearing with a mild sensorineural loss in her left ear.  Word recognition scores are 100% bilaterally.   Speech reception threshold is 20 dB bilaterally.    Procedure:  []    Elgin Grossman DO        [1]   Allergies  Allergen Reactions    Levofloxacin Hives    Doxycycline Nausea/vomiting    Vancomycin Rash

## 2025-06-13 ENCOUNTER — OFFICE VISIT (OUTPATIENT)
Dept: PRIMARY CARE | Facility: CLINIC | Age: 55
End: 2025-06-13
Payer: COMMERCIAL

## 2025-06-13 VITALS
BODY MASS INDEX: 40.82 KG/M2 | RESPIRATION RATE: 16 BRPM | TEMPERATURE: 97.7 F | DIASTOLIC BLOOD PRESSURE: 78 MMHG | HEIGHT: 65 IN | SYSTOLIC BLOOD PRESSURE: 114 MMHG | WEIGHT: 245 LBS | HEART RATE: 80 BPM

## 2025-06-13 DIAGNOSIS — M79.651 PAIN OF RIGHT THIGH: Primary | ICD-10-CM

## 2025-06-13 PROCEDURE — 99214 OFFICE O/P EST MOD 30 MIN: CPT | Performed by: FAMILY MEDICINE

## 2025-06-13 PROCEDURE — 1036F TOBACCO NON-USER: CPT | Performed by: FAMILY MEDICINE

## 2025-06-13 PROCEDURE — 3008F BODY MASS INDEX DOCD: CPT | Performed by: FAMILY MEDICINE

## 2025-06-13 RX ORDER — NAPROXEN 500 MG/1
500 TABLET ORAL 2 TIMES DAILY PRN
Qty: 28 TABLET | Refills: 0 | Status: SHIPPED | OUTPATIENT
Start: 2025-06-13 | End: 2025-06-27

## 2025-06-13 ASSESSMENT — ENCOUNTER SYMPTOMS: MUSCULOSKELETAL PAIN: 1

## 2025-06-13 NOTE — PROGRESS NOTES
"Tenzin Hewitt \"Tiffany\" is a 54 y.o. female who presents for Muscle Pain.     Muscle Pain  This is a new problem. Episode onset: about 2 weeks ago. Pain location: right thigh. Past treatments include cold pack, heat pack, acetaminophen and OTC NSAID. The treatment provided mild relief.      She is describing a 2-week history of off-and-on pain in the right anterior lateral thigh that is not radiating down the leg but it does feel like it starts and spreads out intermittently on the anterior thigh.  She states the pain is worse when she is lying down at night and is also little worse when she walks.  She has tried a variety of over-the-counter NSAIDs without much benefit.  It is not been getting better over time.  She does not recall any specific injury and has never had pain quite like this before.    Visit Vitals  /78   Pulse 80   Temp 36.5 °C (97.7 °F)   Resp 16      Objective   Physical Exam  Constitutional:       Appearance: Normal appearance.   HENT:      Head: Normocephalic.   Pulmonary:      Effort: Pulmonary effort is normal.   Musculoskeletal:      Cervical back: Neck supple.      Comments: The lumbar spine shows full range of motion with no tenderness over the spinous processes.  She does have some mild-year-old to moderate tenderness over the right trochanteric bursa.  There is no pain on internal rotation of the right hip but there is a little bit of pain on external rotation of the right hip.  There is a small firm mobile nodule in the right anterior hip which is not particularly tender.  Does not warm or fluctuant.  Straight leg raise is negative and reflexes are equal and symmetric.  Strength is normal on my exam today   Skin:     General: Skin is warm and dry.   Psychiatric:         Mood and Affect: Mood normal.       No data recorded     No data recorded   No data recorded   Assessment & Plan  Pain of right thigh  This 54-year-old female has a 2-week history of pain on the right " anterior lateral hip.  This is most consistent with a radiating lumbar radicular pattern but the straight leg raise is negative, there is no weakness, and there is no reflex abnormality.  She states that it is worse on lying down flat which is not what we would expect.  I think the differential diagnosis does involve lumbar radiculopathy, muscle injury, or possibly deep femoral bone pain.  With this in mind we will get an x-ray of the femur as well as an x-ray of the lumbar spine to help guide our treatment.  In the meantime she will take naproxen 500 mg twice daily to reduce inflammation.  Orders:    XR lumbar spine 2-3 views; Future    XR femur right 2+ views; Future    naproxen (Naprosyn) 500 mg tablet; Take 1 tablet (500 mg) by mouth 2 times a day as needed for mild pain (1 - 3) (pain) for up to 14 days.           Please excuse any errors in grammar or translation related to this dictation. Voice recognition software was utilized to prepare this document.

## 2025-06-14 ENCOUNTER — HOSPITAL ENCOUNTER (OUTPATIENT)
Dept: RADIOLOGY | Facility: HOSPITAL | Age: 55
Discharge: HOME | End: 2025-06-14
Payer: COMMERCIAL

## 2025-06-14 DIAGNOSIS — M79.651 PAIN OF RIGHT THIGH: ICD-10-CM

## 2025-06-14 PROCEDURE — 72110 X-RAY EXAM L-2 SPINE 4/>VWS: CPT

## 2025-06-14 PROCEDURE — 73552 X-RAY EXAM OF FEMUR 2/>: CPT | Mod: RT

## 2025-06-14 PROCEDURE — 72110 X-RAY EXAM L-2 SPINE 4/>VWS: CPT | Performed by: RADIOLOGY

## 2025-06-14 PROCEDURE — 73552 X-RAY EXAM OF FEMUR 2/>: CPT | Mod: RIGHT SIDE | Performed by: RADIOLOGY

## 2025-06-19 DIAGNOSIS — M79.651 PAIN OF RIGHT THIGH: Primary | ICD-10-CM

## 2025-06-20 ENCOUNTER — OFFICE VISIT (OUTPATIENT)
Dept: ORTHOPEDIC SURGERY | Facility: CLINIC | Age: 55
End: 2025-06-20
Payer: COMMERCIAL

## 2025-06-20 DIAGNOSIS — M79.89 MASS OF SOFT TISSUE OF THIGH: ICD-10-CM

## 2025-06-20 DIAGNOSIS — M47.817 DJD (DEGENERATIVE JOINT DISEASE), LUMBOSACRAL: Primary | ICD-10-CM

## 2025-06-20 DIAGNOSIS — M79.651 PAIN OF RIGHT THIGH: ICD-10-CM

## 2025-06-20 PROCEDURE — 99212 OFFICE O/P EST SF 10 MIN: CPT | Performed by: ORTHOPAEDIC SURGERY

## 2025-06-20 NOTE — PROGRESS NOTES
"    History: Nelda \"Tiffany\" is here for her right leg. Her pain has been present for about one month. There is a bump under the skin that is not painful. She denies burning down the leg. She does have back pain when sitting for long periods. It is worse at night. She has tried naproxen that helped mildly    Past medical history: Multiple  Medications: Multiple  Allergies: No known drug allergies    Please refer to the intake H&P regarding the patient's review of systems, family history and social history as was done today    HEENT: Normal  Lungs: Clear to auscultation  Heart: RRR  Abdomen: Soft, nontender  Skin: clear  Extremity: She has no pain with hip rotation.  No pain around the greater trochanteric area.  Minimal soreness with resisted hip flexion.  She has a palpable nodule in the subcutaneous tissue in the mid thigh anteriorly.  She states there is another 1 laterally as well.  No numbness or tingling.  No back pain with flexion or extension today.  No buttock pain.  Contralateral exam is normal for strength, motion, stability and neurovascular assessment.    Radiographs: X-rays of the femur are essentially negative.  Lumbar x-rays do show loss of lordosis as well as narrowing at the L4-5 level.    Assessment: Right leg soft tissue nodule with persistent pain, lumbar DDD    Plan: The patient's X-rays are essentially negative today.  Her right hip and leg exam is relatively benign other than the palpable soft tissue nodules which may be more lipomas.  At this point it is keeping her from daily activities including walking and she has pain at night.  We will proceed with a right femur MRI. We will follow up to review results.  If negative we may need to consider referral to the spine team as she does have some developing degenerative changes may be causing more radicular symptoms.    All questions were answered today with the patient.    This note was generated with voice recognition software and may contain " grammatical errors.    Scribe Attestation:  By signing my name below, I, Rex Beard attest that this documentation has been prepared under the direction and in the presence of Yassine Rome MD.    Provider Attestation - Scribe documentation:  All medical record entries made by Manuelito Doe were at my direction and personally dictated by me, Yassine Rome MD. I have reviewed the chart and agree that the record is accurate and I confirmed that it reflects my personal performance of the history, physical exam, discussion, and plan.

## 2025-07-11 ENCOUNTER — HOSPITAL ENCOUNTER (OUTPATIENT)
Dept: RADIOLOGY | Facility: HOSPITAL | Age: 55
Discharge: HOME | End: 2025-07-11
Payer: COMMERCIAL

## 2025-07-11 DIAGNOSIS — M79.89 MASS OF SOFT TISSUE OF THIGH: ICD-10-CM

## 2025-07-11 PROCEDURE — A9575 INJ GADOTERATE MEGLUMI 0.1ML: HCPCS | Performed by: ORTHOPAEDIC SURGERY

## 2025-07-11 PROCEDURE — 73720 MRI LWR EXTREMITY W/O&W/DYE: CPT | Mod: RT

## 2025-07-11 PROCEDURE — 2550000001 HC RX 255 CONTRASTS: Performed by: ORTHOPAEDIC SURGERY

## 2025-07-11 RX ORDER — GADOTERATE MEGLUMINE 376.9 MG/ML
0.2 INJECTION INTRAVENOUS
Status: COMPLETED | OUTPATIENT
Start: 2025-07-11 | End: 2025-07-11

## 2025-07-11 RX ADMIN — GADOTERATE MEGLUMINE 22 ML: 376.9 INJECTION INTRAVENOUS at 20:59

## 2025-07-14 ENCOUNTER — OFFICE VISIT (OUTPATIENT)
Dept: ORTHOPEDIC SURGERY | Facility: CLINIC | Age: 55
End: 2025-07-14
Payer: COMMERCIAL

## 2025-07-14 DIAGNOSIS — M47.817 DJD (DEGENERATIVE JOINT DISEASE), LUMBOSACRAL: Primary | ICD-10-CM

## 2025-07-14 DIAGNOSIS — D17.23 LIPOMA OF RIGHT LOWER EXTREMITY: ICD-10-CM

## 2025-07-14 PROCEDURE — 99214 OFFICE O/P EST MOD 30 MIN: CPT | Performed by: ORTHOPAEDIC SURGERY

## 2025-07-14 PROCEDURE — 1036F TOBACCO NON-USER: CPT | Performed by: ORTHOPAEDIC SURGERY

## 2025-07-14 PROCEDURE — 99212 OFFICE O/P EST SF 10 MIN: CPT | Performed by: ORTHOPAEDIC SURGERY

## 2025-07-14 RX ORDER — MELOXICAM 15 MG/1
15 TABLET ORAL DAILY
Qty: 30 TABLET | Refills: 2 | Status: SHIPPED | OUTPATIENT
Start: 2025-07-14 | End: 2025-10-12

## 2025-07-14 NOTE — PROGRESS NOTES
"    History: Nelda \"Tiffany\" is here for her right leg. She did obtain her MRI. She continues to have aching pain mostly when she lays down. She has tried naproxen without much relief. Her hip is starting to bother more frequently.     Past medical history: Multiple  Medications: Multiple  Allergies: No known drug allergies    Please refer to the intake H&P regarding the patient's review of systems, family history and social history as was done today    HEENT: Normal  Lungs: Clear to auscultation  Heart: RRR  Abdomen: Soft, nontender  Skin: clear  Extremity: She has no pain around the thigh itself.  She has fairly minimal pain with any hip rotation, only a small amount more laterally.  She does have pain in the sciatic notch and right lower back region.  No numbness or tingling however.  Contralateral exam is normal for strength, motion, stability and neurovascular assessment.    Radiographs: MRI of right femur is essentially negative with several small lipomas..  There is some mild to moderate degenerative change in the hip joint noted.  Previous x-rays of the femur are essentially negative.  Previous lumbar x-rays do show loss of lordosis as well as narrowing at the L4-5 level.    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 This continues to bother her with her daily activities. I prescribed her a trial of meloxicam today. We will see her back over the next few months if she has any persistent pain or issues. We also put in a referral to the spine team for further evaluation of the lumbar spine.    All questions were answered today with the patient.    This note was generated with voice recognition software and may contain grammatical errors.    Scribe Attestation:  By signing my name below, Alyssa NAGY Scribe attest that this documentation has been prepared under the direction and in the presence of " Yassine Rome MD.    Provider Attestation - Scribe documentation:  All medical record entries made by Alyssa Mcclellan were at my direction and personally dictated by me, Yassine Rome MD. I have reviewed the chart and agree that the record is accurate and I confirmed that it reflects my personal performance of the history, physical exam, discussion, and plan.

## 2025-07-24 ENCOUNTER — APPOINTMENT (OUTPATIENT)
Dept: ORTHOPEDIC SURGERY | Facility: CLINIC | Age: 55
End: 2025-07-24
Payer: COMMERCIAL

## 2025-07-29 ENCOUNTER — APPOINTMENT (OUTPATIENT)
Dept: ORTHOPEDIC SURGERY | Facility: CLINIC | Age: 55
End: 2025-07-29
Payer: COMMERCIAL

## 2025-08-12 ENCOUNTER — APPOINTMENT (OUTPATIENT)
Dept: ORTHOPEDIC SURGERY | Facility: CLINIC | Age: 55
End: 2025-08-12
Payer: COMMERCIAL

## 2025-08-19 ENCOUNTER — APPOINTMENT (OUTPATIENT)
Dept: ORTHOPEDIC SURGERY | Facility: CLINIC | Age: 55
End: 2025-08-19
Payer: COMMERCIAL

## 2025-08-19 DIAGNOSIS — M54.10 BACK PAIN WITH RADICULOPATHY: Primary | ICD-10-CM

## 2025-08-19 PROCEDURE — 99214 OFFICE O/P EST MOD 30 MIN: CPT | Performed by: PHYSICIAN ASSISTANT

## 2025-09-04 ENCOUNTER — APPOINTMENT (OUTPATIENT)
Dept: RADIOLOGY | Facility: CLINIC | Age: 55
End: 2025-09-04
Payer: COMMERCIAL

## 2025-09-16 ENCOUNTER — APPOINTMENT (OUTPATIENT)
Dept: ORTHOPEDIC SURGERY | Facility: CLINIC | Age: 55
End: 2025-09-16
Payer: COMMERCIAL

## 2026-04-06 ENCOUNTER — APPOINTMENT (OUTPATIENT)
Dept: PRIMARY CARE | Facility: CLINIC | Age: 56
End: 2026-04-06
Payer: COMMERCIAL